# Patient Record
Sex: FEMALE | Race: BLACK OR AFRICAN AMERICAN | NOT HISPANIC OR LATINO | Employment: FULL TIME | URBAN - METROPOLITAN AREA
[De-identification: names, ages, dates, MRNs, and addresses within clinical notes are randomized per-mention and may not be internally consistent; named-entity substitution may affect disease eponyms.]

---

## 2018-01-02 ENCOUNTER — OFFICE VISIT CONVERTED (OUTPATIENT)
Dept: INTERNAL MEDICINE | Facility: CLINIC | Age: 23
End: 2018-01-02
Attending: NURSE PRACTITIONER

## 2018-09-07 ENCOUNTER — OFFICE VISIT CONVERTED (OUTPATIENT)
Dept: INTERNAL MEDICINE | Facility: CLINIC | Age: 23
End: 2018-09-07
Attending: NURSE PRACTITIONER

## 2019-03-08 ENCOUNTER — HOSPITAL ENCOUNTER (OUTPATIENT)
Dept: OTHER | Facility: HOSPITAL | Age: 24
Discharge: HOME | End: 2019-03-08
Attending: NURSE PRACTITIONER

## 2019-03-08 ENCOUNTER — OFFICE VISIT CONVERTED (OUTPATIENT)
Dept: INTERNAL MEDICINE | Facility: CLINIC | Age: 24
End: 2019-03-08
Attending: NURSE PRACTITIONER

## 2019-03-08 LAB
APPEARANCE UR: ABNORMAL
BILIRUB UR QL: NEGATIVE
COLOR UR: YELLOW
CONV BACTERIA: ABNORMAL
CONV COLLECTION SOURCE (UA): ABNORMAL
CONV HYALINE CASTS IN URINE MICRO: ABNORMAL /[LPF]
CONV UROBILINOGEN IN URINE BY AUTOMATED TEST STRIP: 0.2 {EHRLICHU}/DL (ref 0.1–1)
GLUCOSE UR QL: NEGATIVE MG/DL
HGB UR QL STRIP: ABNORMAL
KETONES UR QL STRIP: NEGATIVE MG/DL
LEUKOCYTE ESTERASE UR QL STRIP: ABNORMAL
NITRITE UR QL STRIP: NEGATIVE
PH UR STRIP.AUTO: 5.5 [PH] (ref 5–8)
PROT UR QL: NEGATIVE MG/DL
RBC #/AREA URNS HPF: ABNORMAL /[HPF]
SP GR UR: 1.02 (ref 1–1.03)
SQUAMOUS SPT QL MICRO: ABNORMAL /[HPF]
WBC #/AREA URNS HPF: ABNORMAL /[HPF]

## 2019-03-10 LAB
AMOXICILLIN+CLAV SUSC ISLT: <=2
AMPICILLIN SUSC ISLT: <=2
AMPICILLIN+SULBAC SUSC ISLT: <=2
BACTERIA UR CULT: ABNORMAL
CEFAZOLIN SUSC ISLT: <=4
CEFEPIME SUSC ISLT: <=1
CEFTAZIDIME SUSC ISLT: <=1
CEFTRIAXONE SUSC ISLT: <=1
CEFUROXIME ORAL SUSC ISLT: 4
CEFUROXIME PARENTER SUSC ISLT: 4
CIPROFLOXACIN SUSC ISLT: <=0.25
ERTAPENEM SUSC ISLT: <=0.5
GENTAMICIN SUSC ISLT: <=1
LEVOFLOXACIN SUSC ISLT: <=0.12
NITROFURANTOIN SUSC ISLT: <=16
TETRACYCLINE SUSC ISLT: <=1
TMP SMX SUSC ISLT: <=20
TOBRAMYCIN SUSC ISLT: <=1

## 2019-09-24 ENCOUNTER — OFFICE VISIT CONVERTED (OUTPATIENT)
Dept: INTERNAL MEDICINE | Facility: CLINIC | Age: 24
End: 2019-09-24
Attending: PHYSICIAN ASSISTANT

## 2019-09-24 ENCOUNTER — HOSPITAL ENCOUNTER (OUTPATIENT)
Dept: OTHER | Facility: HOSPITAL | Age: 24
Discharge: HOME | End: 2019-09-24
Attending: PHYSICIAN ASSISTANT

## 2019-09-26 LAB — BACTERIA SPEC AEROBE CULT: NORMAL

## 2019-12-02 ENCOUNTER — HOSPITAL ENCOUNTER (OUTPATIENT)
Dept: URGENT CARE | Facility: CLINIC | Age: 24
Discharge: HOME | End: 2019-12-02
Attending: EMERGENCY MEDICINE

## 2019-12-30 ENCOUNTER — OFFICE VISIT CONVERTED (OUTPATIENT)
Dept: INTERNAL MEDICINE | Facility: CLINIC | Age: 24
End: 2019-12-30
Attending: PHYSICIAN ASSISTANT

## 2019-12-30 ENCOUNTER — HOSPITAL ENCOUNTER (OUTPATIENT)
Dept: OTHER | Facility: HOSPITAL | Age: 24
Discharge: HOME | End: 2019-12-30
Attending: PHYSICIAN ASSISTANT

## 2019-12-30 LAB
APPEARANCE UR: ABNORMAL
BILIRUB UR QL: NEGATIVE
COLOR UR: YELLOW
CONV BACTERIA: ABNORMAL
CONV COLLECTION SOURCE (UA): ABNORMAL
CONV HYALINE CASTS IN URINE MICRO: ABNORMAL /[LPF]
CONV UROBILINOGEN IN URINE BY AUTOMATED TEST STRIP: 1 {EHRLICHU}/DL (ref 0.1–1)
GLUCOSE UR QL: NEGATIVE MG/DL
HGB UR QL STRIP: ABNORMAL
KETONES UR QL STRIP: NEGATIVE MG/DL
LEUKOCYTE ESTERASE UR QL STRIP: ABNORMAL
NITRITE UR QL STRIP: NEGATIVE
PH UR STRIP.AUTO: 6 [PH] (ref 5–8)
PROT UR QL: 30 MG/DL
RBC #/AREA URNS HPF: ABNORMAL /[HPF]
SP GR UR: 1.03 (ref 1–1.03)
SQUAMOUS SPT QL MICRO: ABNORMAL /[HPF]
WBC #/AREA URNS HPF: ABNORMAL /[HPF]

## 2020-01-02 LAB
AMOXICILLIN+CLAV SUSC ISLT: <=2
AMPICILLIN SUSC ISLT: <=2
AMPICILLIN+SULBAC SUSC ISLT: <=2
BACTERIA UR CULT: ABNORMAL
BACTERIA UR CULT: ABNORMAL
CEFAZOLIN SUSC ISLT: <=4
CEFEPIME SUSC ISLT: <=1
CEFTAZIDIME SUSC ISLT: <=1
CEFTRIAXONE SUSC ISLT: <=1
CEFUROXIME ORAL SUSC ISLT: 4
CEFUROXIME PARENTER SUSC ISLT: 4
CIPROFLOXACIN SUSC ISLT: <=0.25
ERTAPENEM SUSC ISLT: <=0.5
GENTAMICIN SUSC ISLT: <=1
LEVOFLOXACIN SUSC ISLT: <=0.12
NITROFURANTOIN SUSC ISLT: <=16
TETRACYCLINE SUSC ISLT: <=1
TMP SMX SUSC ISLT: <=20
TOBRAMYCIN SUSC ISLT: <=1

## 2020-05-05 ENCOUNTER — HOSPITAL ENCOUNTER (OUTPATIENT)
Dept: URGENT CARE | Facility: CLINIC | Age: 25
Discharge: HOME | End: 2020-05-05
Attending: PHYSICIAN ASSISTANT

## 2020-07-12 ENCOUNTER — HOSPITAL ENCOUNTER (OUTPATIENT)
Dept: LABOR AND DELIVERY | Facility: HOSPITAL | Age: 25
Discharge: HOME | End: 2020-07-12
Attending: OBSTETRICS & GYNECOLOGY

## 2020-09-08 ENCOUNTER — OFFICE VISIT CONVERTED (OUTPATIENT)
Dept: INTERNAL MEDICINE | Facility: CLINIC | Age: 25
End: 2020-09-08
Attending: NURSE PRACTITIONER

## 2020-09-08 ENCOUNTER — CONVERSION ENCOUNTER (OUTPATIENT)
Dept: INTERNAL MEDICINE | Facility: CLINIC | Age: 25
End: 2020-09-08

## 2020-09-08 ENCOUNTER — HOSPITAL ENCOUNTER (OUTPATIENT)
Dept: OTHER | Facility: HOSPITAL | Age: 25
Discharge: HOME | End: 2020-09-08
Attending: NURSE PRACTITIONER

## 2020-09-08 LAB
APPEARANCE UR: ABNORMAL
BILIRUB UR QL: NEGATIVE
CASTS URNS QL MICRO: ABNORMAL /[LPF]
COLOR UR: YELLOW
CONV BACTERIA: ABNORMAL
CONV COLLECTION SOURCE (UA): ABNORMAL
CONV UROBILINOGEN IN URINE BY AUTOMATED TEST STRIP: 1 {EHRLICHU}/DL (ref 0.1–1)
GLUCOSE UR QL: NEGATIVE MG/DL
HGB UR QL STRIP: ABNORMAL
KETONES UR QL STRIP: NEGATIVE MG/DL
LEUKOCYTE ESTERASE UR QL STRIP: ABNORMAL
NITRITE UR QL STRIP: NEGATIVE
PH UR STRIP.AUTO: 7.5 [PH] (ref 5–8)
PROT UR QL: 30 MG/DL
RBC #/AREA URNS HPF: ABNORMAL /[HPF]
SP GR UR: 1.02 (ref 1–1.03)
SQUAMOUS SPT QL MICRO: ABNORMAL /[HPF]
WBC #/AREA URNS HPF: ABNORMAL /[HPF]

## 2020-09-11 LAB
BACTERIA UR CULT: ABNORMAL
CIPROFLOXACIN SUSC ISLT: <=0.5
DAPTOMYCIN SUSC ISLT: 0.5
DOXYCYCLINE SUSC ISLT: <=0.5
ERYTHROMYCIN SUSC ISLT: <=0.25
GENTAMICIN SUSC ISLT: <=0.5
LEVOFLOXACIN SUSC ISLT: 0.5
NITROFURANTOIN SUSC ISLT: <=16
OXACILLIN SUSC ISLT: 0.5
RIFAMPIN SUSC ISLT: <=0.5
TETRACYCLINE SUSC ISLT: <=1
TIGECYCLINE SUSC ISLT: <=0.12
TMP SMX SUSC ISLT: <=10
VANCOMYCIN SUSC ISLT: 1

## 2020-12-17 ENCOUNTER — OFFICE VISIT CONVERTED (OUTPATIENT)
Dept: INTERNAL MEDICINE | Facility: CLINIC | Age: 25
End: 2020-12-17
Attending: NURSE PRACTITIONER

## 2021-05-13 NOTE — PROGRESS NOTES
"   Progress Note      Patient Name: Angelica Reynaga   Patient ID: [id number]   Sex: Female   YOB: 1995    Primary Care Provider: Shanda REECE    Visit Date: September 8, 2020    Provider: CHEVY Wasserman   Location: [facility]   Location Address: [address] [place] [address], [facility] [address]  [place], [place]  [id number]   Location Phone: [phone]          Chief Complaint  · \"having burning with urination\"  · F/U with zoloft      History Of Present Illness  Angelica Reynaga is a 25 year old /Black female who presents for evaluation and treatment of:      F/U     Zoloft working well. she has been getting it from Dr. Rock since postpartum.  She was started on 50 mg about 3 weeks ago and reports doing well.  She is not having any SI or HI.    \"having burning with urination\" x5 days  she also reports having vaginal discharge, \"white/yellow chunks\"  using monistat       Past Medical History  Disease Name Date Onset Notes   Anxiety --  --    Hemorrhoids --  --    Ovarian cyst --  --          Past Surgical History  Procedure Name Date Notes   Insertion of Nexplanon implant 2014 --    Removal of Nexplanon implant 2017 --          Medication List  Name Date Started Instructions   Zoloft 50 mg oral tablet  take 1 tablet (50 mg) by oral route once daily for 30 days         Allergy List  Allergen Name Date Reaction Notes   NO KNOWN DRUG ALLERGIES --  --  --          Family Medical History  Disease Name Relative/Age Notes   Stroke  --    Diabetes Mellitus, Type II  --          Social History  Finding Status Start/Stop Quantity Notes   Alcohol --  --/-- --  --    Tobacco Never --/-- --  --          Immunizations  NameDate Admin Mfg Trade Name Lot Number Route Inj VIS Given VIS Publication   Eleejtduk17/11/2018 [employer] Fluzone > 3 Years [id number] IM LD 10/11/2018 08/19/2014   Comments: pt tolerated well and left offie in stable condition, CARO Vargas         Review of " "Systems  · Constitutional  o Denies  o : fever, fatigue, weight loss, weight gain  · Cardiovascular  o Denies  o : lower extremity edema, claudication, chest pressure, palpitations  · Respiratory  o Denies  o : shortness of breath, wheezing, cough, hemoptysis, dyspnea on exertion  · Gastrointestinal  o Denies  o : nausea, vomiting, diarrhea, constipation, abdominal pain      Vitals  Date Time BP Position Site L\R Cuff Size HR RR TEMP (F) WT  HT  BMI kg/m2 BSA m2 O2 Sat HC       09/24/2019 02:09 /64 Sitting    76 - R 14 98.3 150lbs 3oz    99 %    12/30/2019 02:26 /58 Sitting    89 - R 16 98.2 150lbs 2oz    98 %    09/08/2020 01:00 /70 Sitting    98 - R  98.1 149lbs 0oz 4'  11\" 30.09 1.68 98 %          Physical Examination  · Constitutional  o Appearance  o : no acute distress, well-nourished  · Head and Face  o Head  o :   § Inspection  § : atraumatic, normocephalic  · Eyes  o Eyes  o : extraocular movements intact, no scleral icterus, no conjunctival injection  · Ears, Nose, Mouth and Throat  o Ears  o :   § External Ears  § : normal  o Nose  o :   § Intranasal Exam  § : nares patent  o Oral Cavity  o :   § Oral Mucosa  § : moist mucous membranes  · Respiratory  o Respiratory Effort  o : breathing comfortably, symmetric chest rise  o Auscultation of Lungs  o : clear to asculatation bilaterally, no wheezes, rales, or rhonchii  · Cardiovascular  o Heart  o :   § Auscultation of Heart  § : regular rate and rhythm, no murmurs, rubs, or gallops  o Peripheral Vascular System  o :   § Extremities  § : no edema  · Gastrointestinal  o Abdominal Examination  o :   § Abdomen  § : bowel sounds present, non-distended, non-tender, no CVA tenderness  · Lymphatic  o Neck  o : no lymphadenopathy present  · Neurologic  o Mental Status Examination  o :   § Orientation  § : grossly oriented to person, place and time  o Gait and Station  o :   § Gait Screening  § : normal gait  · Psychiatric  o General  o : normal mood " and affect          Results  · In-Office Procedures  o Lab procedure  § IOP - Urinalysis without Microscopy (Clinitek) [facility] ([id number])   § Color Ur: Yellow   § Clarity Ur: Cloudy   § Glucose Ur Ql Strip: Negative   § Bilirub Ur Ql Strip: Negative   § Ketones Ur Ql Strip: Negative   § Sp Gr Ur Qn: 1.020   § Hgb Ur Ql Strip: Trace-Intact   § pH Ur-LsCnc: 7.0   § Prot Ur Ql Strip: 100 mg/dL   § Urobilinogen Ur Strip-mCnc: 0.2 E.U./dL   § Nitrite Ur Ql Strip: Negative   § WBC Est Ur Ql Strip: Large       Assessment  · Anxiety     300.02/F41.1  · Screening for depression     V79.0/Z13.89  · Depression     311/F32.9  Doing well with Zoloft 50 mg. We will follow-up in 3 months to see how she is doing. She also verbalized that she would like to consider doing counseling. Resources provided  · Dysuria     788.1/R30.0  UA abnormal in the office. Sending for micro and culture. Will start on Bactrim DS x5 days.    Problems Reconciled  Plan  · Orders  o ACO-39: Current medications updated and reviewed () - - 09/08/2020  o Urinalysis with Reflex Microscopy if abnormal ([facility]) ([id number]) - V79.0/Z13.89, 788.1/R30.0 - 09/08/2020  o Urine Culture (Clean Catch) [facility] ([id number]) - V79.0/Z13.89, 788.1/R30.0 - 09/08/2020  o Vaginal Screen (Candida, Gardnerella & Trichomonas) ([id number]) - V79.0/Z13.89, 788.1/R30.0 - 09/08/2020  · Medications  o Bactrim -160 mg oral tablet   SIG: take 1 tablet by oral route every 12 hours for 5 days   DISP: (10) tablets with 0 refills  Prescribed on 09/08/2020     o Zoloft 50 mg oral tablet   SIG: take 1 tablet (50 mg) by oral route once daily for 90 days   DISP: (90) tablets with 0 refills  Adjusted on 09/08/2020     o Medications have been Reconciled  o Transition of Care or Provider Policy  · Instructions  o Depression Screen completed and scanned into the EMR under the designated folder within the patient's documents.  o Discussed the need for therapy, either with a certified counselor, psychologist, and/or  family . If no improvement is noted or worsening of their condition, return to office or ER. But also discussed with patient that if they are non-responsive to the type of medication they may need to see a psychiatrist for further evaluation and management.  o Patient was given an SSRI/SSNRI medication and warned of possible side effects of the medication including potential for increased risk of suicidal thoughts and feelings. Patient was instructed that if they begin to exhibit any of these effects they will discontinue the medication immediately and contact our office or the ER ASAP.  o Patient was educated/instructed on their diagnosis, treatment and medications prior to discharge from the clinic today.  o Call the office with any concerns or questions.  · Disposition  o Call or Return if symptoms worsen or persist.  o Prescriptions sent electronically            Electronically Signed by: CHEVY Wasserman -Author on September 8, 2020 02:21:31 PM

## 2021-05-14 VITALS
TEMPERATURE: 98.1 F | OXYGEN SATURATION: 98 % | BODY MASS INDEX: 30.04 KG/M2 | WEIGHT: 149 LBS | SYSTOLIC BLOOD PRESSURE: 122 MMHG | HEIGHT: 59 IN | DIASTOLIC BLOOD PRESSURE: 70 MMHG | HEART RATE: 98 BPM

## 2021-05-14 VITALS
WEIGHT: 156.25 LBS | RESPIRATION RATE: 16 BRPM | HEART RATE: 77 BPM | OXYGEN SATURATION: 99 % | TEMPERATURE: 97.4 F | BODY MASS INDEX: 31.5 KG/M2 | SYSTOLIC BLOOD PRESSURE: 118 MMHG | HEIGHT: 59 IN | DIASTOLIC BLOOD PRESSURE: 72 MMHG

## 2021-05-14 NOTE — PROGRESS NOTES
Progress Note      Patient Name: Angelica Reynaga   Patient ID: [id number]   Sex: Female   YOB: 1995    Primary Care Provider: Shanda REECE    Visit Date: December 17, 2020    Provider: CHEVY Wasserman   Location: [facility]   Location Address: [address] [place] [address], [address]  [place], [place]  [id number]   Location Phone: [phone]          Chief Complaint  · Follow-up      History Of Present Illness  Angelica Reynaga is a 25 year old /Black female who presents for evaluation and treatment of:      anxiety-Zoloft working well    right hand, irritation on 5th digit following injury that was 6 mths ago that she thought healed    congestion in the last 2-3 mths  denies feeling sick, fever, chills, body aches  cough off and on  has tried decongestants, Benadryl without improvement  difficulty hearing at times       Past Medical History  Disease Name Date Onset Notes   Anxiety --  --    Hemorrhoids --  --    Ovarian cyst --  --          Past Surgical History  Procedure Name Date Notes   Insertion of Nexplanon implant 2014 --    Removal of Nexplanon implant 2017 --          Medication List  Name Date Started Instructions   Zoloft 50 mg oral tablet 12/17/2020 take 1 tablet (50 mg) by oral route once daily for 90 days         Allergy List  Allergen Name Date Reaction Notes   NO KNOWN DRUG ALLERGIES --  --  --          Family Medical History  Disease Name Relative/Age Notes   Stroke  --    Diabetes Mellitus, Type II  --          Social History  Finding Status Start/Stop Quantity Notes   Alcohol --  --/-- --  --    Tobacco Never --/-- --  --          Immunizations  NameDate Admin Mfg Trade Name Lot Number Route Inj VIS Given VIS Publication   Fhnapodbi02/17/2020 [facility] Fluzone Quadrivalent [id number] IM LD 08/15/2019 08/15/2019   Comments: pt tolerated well, left office in stable condition         Review of Systems  · Constitutional  o Denies  o : fever, fatigue, weight loss,  "weight gain  · Cardiovascular  o Denies  o : lower extremity edema, claudication, chest pressure, palpitations  · Respiratory  o Admits  o : cough  o Denies  o : shortness of breath, wheezing, hemoptysis, dyspnea on exertion  · Gastrointestinal  o Denies  o : nausea, vomiting, diarrhea, constipation, abdominal pain      Vitals  Date Time BP Position Site L\R Cuff Size HR RR TEMP (F) WT  HT  BMI kg/m2 BSA m2 O2 Sat FR L/min FiO2 HC       12/30/2019 02:26 /58 Sitting    89 - R 16 98.2 150lbs 2oz    98 %      09/08/2020 01:00 /70 Sitting    98 - R  98.1 149lbs 0oz 4'  11\" 30.09 1.68 98 %      12/17/2020 10:31 /72 Sitting    77 - R 16 97.4 156lbs 4oz 4'  11\" 31.56 1.72 99 %  21%          Physical Examination  · Constitutional  o Appearance  o : no acute distress, well-nourished  · Head and Face  o Head  o :   § Inspection  § : atraumatic, normocephalic  · Eyes  o Eyes  o : extraocular movements intact, no scleral icterus, no conjunctival injection  · Ears, Nose, Mouth and Throat  o Ears  o :   § External Ears  § : normal  § Otoscopic Examination  § : Cerumen impaction, unable to visualize TMs even following irrigation, some erythema of right outer canal status post irrigation  o Nose  o :   § Intranasal Exam  § : nares patent  o Oral Cavity  o :   § Oral Mucosa  § : moist mucous membranes  o Throat  o :   § Oropharynx  § : no inflammation or lesions present, tonsils within normal limits  · Respiratory  o Respiratory Effort  o : breathing comfortably, symmetric chest rise  o Auscultation of Lungs  o : clear to asculatation bilaterally, no wheezes, rales, or rhonchii  · Cardiovascular  o Heart  o :   § Auscultation of Heart  § : regular rate and rhythm, no murmurs, rubs, or gallops  o Peripheral Vascular System  o :   § Extremities  § : no edema  · Lymphatic  o Neck  o : no lymphadenopathy present  o Supraclavicular Nodes  o : no supraclavicular nodes  · Skin and Subcutaneous Tissue  o General " Inspection  o : Small wart like lesion on right lateral surface of fifth digit near tip  · Neurologic  o Mental Status Examination  o :   § Orientation  § : grossly oriented to person, place and time  o Gait and Station  o :   § Gait Screening  § : normal gait  · Psychiatric  o General  o : normal mood and affect          Assessment  · Anxiety     300.02/F41.1  Well-controlled, continue Zoloft  · Need for influenza vaccination     V04.81/Z23  · Wart     078.10/B07.9  Cryotherapy utilized and tolerated well. Follow-up care instructions provided  · Cerumen impaction     380.4/H61.20  Ears irrigated bilaterally during visit. She will use Debrox drops at home for the next 2 weeks and call if pain, abnormal discharge or fever develops  · Hearing abnormally acute, bilateral     388.42/H93.233  Likely secondary to cerumen impaction bilaterally as well as sinus congestion. She will call if this does not improve in 2 to 3 weeks  · Congestion of nasal sinus     478.19/R09.81  We will try Zyrtec daily for the next 3 weeks. If symptoms are not improved by that time, she will call for further eval. Discussed risk of sinusitis but low suspicion for this at this time.    Problems Reconciled  Plan  · Orders  o Fluzone Quadrivalent Vaccine, age 6 months + (18520) - V04.81/Z23 - 12/17/2020   Vaccine - Influenza; Dose: 0.5; Site: Left Deltoid; Route: Intramuscular; Date: 12/17/2020 11:46:00; Exp: 06/01/2021; Lot: [id number]; Mfg: sanofi pasteur; TradeName: Fluzone Quadrivalent; Administered By: Deon Trevino MA; Comment: pt tolerated well, left office in stable condition  o ACO-14: Influenza immunization administered or previously received () - V04.81/Z23 - 12/17/2020  o Immunization Admin Fee (Single) ([facility]) (84767) - V04.81/Z23 - 12/17/2020  o ACO-39: Current medications updated and reviewed (1159F, ) - - 12/17/2020  · Medications  o Zyrtec 10 mg oral tablet   SIG: take 1 tablet (10 mg) by oral route once daily for 90 days    DISP: (90) Tablet with 1 refills  Prescribed on 12/17/2020     o Zoloft 50 mg oral tablet   SIG: take 1 tablet (50 mg) by oral route once daily for 90 days   DISP: (90) Tablet with 1 refills  Adjusted on 12/17/2020     o Medications have been Reconciled  o Transition of Care or Provider Policy  · Instructions  o Patient was educated/instructed on their diagnosis, treatment and medications prior to discharge from the clinic today.  o Call the office with any concerns or questions.  o Discussed Covid-19 precautions including, but not limited to, social distancing, avoid touching your face, and hand washing.   · Disposition  o Call or Return if symptoms worsen or persist.  o Follow up in 6 months  o Prescriptions sent electronically            Electronically Signed by: CHEVY Wasserman -Author on December 17, 2020 12:34:18 PM

## 2021-05-15 VITALS
TEMPERATURE: 98.1 F | WEIGHT: 144.5 LBS | BODY MASS INDEX: 29.13 KG/M2 | DIASTOLIC BLOOD PRESSURE: 72 MMHG | SYSTOLIC BLOOD PRESSURE: 118 MMHG | HEIGHT: 59 IN | OXYGEN SATURATION: 98 % | RESPIRATION RATE: 12 BRPM | HEART RATE: 88 BPM

## 2021-05-15 VITALS
DIASTOLIC BLOOD PRESSURE: 58 MMHG | WEIGHT: 150.12 LBS | RESPIRATION RATE: 16 BRPM | TEMPERATURE: 98.2 F | OXYGEN SATURATION: 98 % | SYSTOLIC BLOOD PRESSURE: 102 MMHG | HEART RATE: 89 BPM

## 2021-05-15 VITALS
TEMPERATURE: 98.3 F | DIASTOLIC BLOOD PRESSURE: 64 MMHG | RESPIRATION RATE: 14 BRPM | WEIGHT: 150.19 LBS | HEART RATE: 76 BPM | SYSTOLIC BLOOD PRESSURE: 128 MMHG | OXYGEN SATURATION: 99 %

## 2021-05-16 VITALS
TEMPERATURE: 96.9 F | HEART RATE: 84 BPM | BODY MASS INDEX: 26.51 KG/M2 | HEIGHT: 59 IN | DIASTOLIC BLOOD PRESSURE: 60 MMHG | OXYGEN SATURATION: 99 % | RESPIRATION RATE: 15 BRPM | WEIGHT: 131.5 LBS | SYSTOLIC BLOOD PRESSURE: 110 MMHG

## 2021-05-16 VITALS
SYSTOLIC BLOOD PRESSURE: 130 MMHG | BODY MASS INDEX: 35.58 KG/M2 | TEMPERATURE: 97.6 F | HEART RATE: 101 BPM | DIASTOLIC BLOOD PRESSURE: 78 MMHG | RESPIRATION RATE: 15 BRPM | OXYGEN SATURATION: 99 % | HEIGHT: 59 IN | WEIGHT: 176.5 LBS

## 2021-07-14 PROCEDURE — 87491 CHLMYD TRACH DNA AMP PROBE: CPT | Performed by: FAMILY MEDICINE

## 2021-07-14 PROCEDURE — 87591 N.GONORRHOEAE DNA AMP PROB: CPT | Performed by: FAMILY MEDICINE

## 2021-07-14 PROCEDURE — G0432 EIA HIV-1/HIV-2 SCREEN: HCPCS | Performed by: FAMILY MEDICINE

## 2021-07-14 PROCEDURE — 86592 SYPHILIS TEST NON-TREP QUAL: CPT | Performed by: FAMILY MEDICINE

## 2021-07-14 PROCEDURE — 87086 URINE CULTURE/COLONY COUNT: CPT | Performed by: FAMILY MEDICINE

## 2021-07-14 PROCEDURE — 80074 ACUTE HEPATITIS PANEL: CPT | Performed by: FAMILY MEDICINE

## 2021-12-06 ENCOUNTER — OFFICE VISIT (OUTPATIENT)
Dept: INTERNAL MEDICINE | Facility: CLINIC | Age: 26
End: 2021-12-06

## 2021-12-06 VITALS
TEMPERATURE: 97.4 F | WEIGHT: 162 LBS | DIASTOLIC BLOOD PRESSURE: 70 MMHG | HEIGHT: 59 IN | RESPIRATION RATE: 16 BRPM | SYSTOLIC BLOOD PRESSURE: 108 MMHG | HEART RATE: 87 BPM | OXYGEN SATURATION: 99 % | BODY MASS INDEX: 32.66 KG/M2

## 2021-12-06 DIAGNOSIS — J02.9 SORE THROAT: ICD-10-CM

## 2021-12-06 DIAGNOSIS — J01.90 ACUTE SINUSITIS, RECURRENCE NOT SPECIFIED, UNSPECIFIED LOCATION: Primary | ICD-10-CM

## 2021-12-06 PROCEDURE — 99213 OFFICE O/P EST LOW 20 MIN: CPT | Performed by: PHYSICIAN ASSISTANT

## 2021-12-06 RX ORDER — AMOXICILLIN AND CLAVULANATE POTASSIUM 875; 125 MG/1; MG/1
1 TABLET, FILM COATED ORAL 2 TIMES DAILY
Qty: 20 TABLET | Refills: 0 | Status: SHIPPED | OUTPATIENT
Start: 2021-12-06 | End: 2021-12-16

## 2021-12-06 RX ORDER — FLUTICASONE PROPIONATE 50 MCG
2 SPRAY, SUSPENSION (ML) NASAL DAILY
Qty: 11.1 ML | Refills: 1 | Status: SHIPPED | OUTPATIENT
Start: 2021-12-06 | End: 2022-03-31 | Stop reason: SDUPTHER

## 2021-12-06 RX ORDER — CETIRIZINE HYDROCHLORIDE 10 MG/1
10 TABLET ORAL DAILY
Qty: 30 TABLET | Refills: 1 | Status: SHIPPED | OUTPATIENT
Start: 2021-12-06 | End: 2022-03-31 | Stop reason: SDUPTHER

## 2021-12-06 NOTE — PROGRESS NOTES
"Chief Complaint  Nasal Congestion    Subjective          Angelica Reynaga presents to [facility]  Sore throat, nasal congestion, runny nose on and off x 1 month   Ear pain occasionally. [place], R side of head is worse than L. Denies unilateral weakness, slurred speech, confusion.  Denies fever, chills, body aches  Denies cough, wheezing, sob, resp distress  Appetite and energy are normal   Kids had viral infection at home.  Pt has tried benadryl, , Nyquil which helped with congestion but never resolved sx      Past Medical History:   Diagnosis Date   • Anxiety    • Depression         Past Surgical History:   Procedure Laterality Date   •  SECTION      x2        Current Outpatient Medications on File Prior to Visit   Medication Sig Dispense Refill   • Etonogestrel (NEXPLANON) 68 MG implant subdermal implant Inject 1 each into the appropriate area of the skin as directed by provider 1 (One) Time.     • sertraline (ZOLOFT) 50 MG tablet Take 50 mg by mouth Daily.       No current facility-administered medications on file prior to visit.        No Known Allergies    Social History     Tobacco Use   Smoking Status Never Smoker   Smokeless Tobacco Never Used          Objective   Vital Signs:   /70   Pulse 87   Temp 97.4 °F (36.3 °C)   Resp 16   Ht 149.9 cm (59\")   Wt 73.5 kg (162 lb)   SpO2 99%   BMI 32.72 kg/m²     Physical Exam  Vitals reviewed.   Constitutional:       Appearance: Normal appearance.   HENT:      Head: Normocephalic and atraumatic.      Nose: Congestion and rhinorrhea present.      Mouth/Throat:      Mouth: Mucous membranes are moist.      Pharynx: Uvula midline. Posterior oropharyngeal erythema present. No oropharyngeal exudate.      Tonsils: 2+ on the right. 2+ on the left.   Eyes:      Extraocular Movements: Extraocular movements intact.      Conjunctiva/sclera: Conjunctivae normal.      Pupils: Pupils are equal, round, and reactive " to light.   Cardiovascular:      Rate and Rhythm: Normal rate and regular rhythm.   Pulmonary:      Effort: Pulmonary effort is normal.      Breath sounds: Normal breath sounds.   Abdominal:      General: Abdomen is flat. Bowel sounds are normal.      Palpations: Abdomen is soft.   Musculoskeletal:         General: Normal range of motion.   Neurological:      General: No focal deficit present.      Mental Status: She is alert and oriented to person, place, and time.   Psychiatric:         Mood and Affect: Mood normal.        Result Review :                 Assessment and Plan    Diagnoses and all orders for this visit:    1. Acute sinusitis, recurrence not specified, unspecified location (Primary)  Assessment & Plan:  Discussed sinus infection. Increase water intake, rest, hand hygiene. Tylenol/motrin PRN fever or pain. Start OTC antihistamines and nasal steroid. Will start antibiotic today. Patient will let us know if the symptoms are not resolved with conservative treatment.        2. Sore throat    Other orders  -     amoxicillin-clavulanate (Augmentin) 875-125 MG per tablet; Take 1 tablet by mouth 2 (Two) Times a Day for 10 days.  Dispense: 20 tablet; Refill: 0  -     cetirizine (zyrTEC) 10 MG tablet; Take 1 tablet by mouth Daily.  Dispense: 30 tablet; Refill: 1  -     fluticasone (Flonase) 50 MCG/ACT nasal spray; 2 sprays into the nostril(s) as directed by provider Daily.  Dispense: 11.1 mL; Refill: 1      Follow Up   Return if symptoms worsen or fail to improve.  Patient was given instructions and counseling regarding her condition or for health maintenance advice. Please see specific information pulled into the AVS if appropriate.

## 2021-12-06 NOTE — PATIENT INSTRUCTIONS
Sinusitis, Adult  Sinusitis is inflammation of your sinuses. Sinuses are hollow spaces in the bones around your face. Your sinuses are located:  · Around your eyes.  · In the middle of your forehead.  · Behind your nose.  · In your cheekbones.  Mucus normally drains out of your sinuses. When your nasal tissues become inflamed or swollen, mucus can become trapped or blocked. This allows bacteria, viruses, and fungi to grow, which leads to infection. Most infections of the sinuses are caused by a virus.  Sinusitis can develop quickly. It can last for up to 4 weeks (acute) or for more than 12 weeks (chronic). Sinusitis often develops after a cold.  What are the causes?  This condition is caused by anything that creates swelling in the sinuses or stops mucus from draining. This includes:  · Allergies.  · Asthma.  · Infection from bacteria or viruses.  · Deformities or blockages in your nose or sinuses.  · Abnormal growths in the nose (nasal polyps).  · Pollutants, such as chemicals or irritants in the air.  · Infection from fungi (rare).  What increases the risk?  You are more likely to develop this condition if you:  · Have a weak body defense system (immune system).  · Do a lot of swimming or diving.  · Overuse nasal sprays.  · Smoke.  What are the signs or symptoms?  The main symptoms of this condition are pain and a feeling of pressure around the affected sinuses. Other symptoms include:  · Stuffy nose or congestion.  · Thick drainage from your nose.  · Swelling and warmth over the affected sinuses.  · Headache.  · Upper toothache.  · A cough that may get worse at night.  · Extra mucus that collects in the throat or the back of the nose (postnasal drip).  · Decreased sense of smell and taste.  · Fatigue.  · A fever.  · Sore throat.  · Bad breath.  How is this diagnosed?  This condition is diagnosed based on:  · Your symptoms.  · Your medical history.  · A physical exam.  · Tests to find out if your condition is  acute or chronic. This may include:  ? Checking your nose for nasal polyps.  ? Viewing your sinuses using a device that has a light (endoscope).  ? Testing for allergies or bacteria.  ? Imaging tests, such as an MRI or CT scan.  In rare cases, a bone biopsy may be done to rule out more serious types of fungal sinus disease.  How is this treated?  Treatment for sinusitis depends on the cause and whether your condition is chronic or acute.  · If caused by a virus, your symptoms should go away on their own within 10 days. You may be given medicines to relieve symptoms. They include:  ? Medicines that shrink swollen nasal passages (topical intranasal decongestants).  ? Medicines that treat allergies (antihistamines).  ? A spray that eases inflammation of the nostrils (topical intranasal corticosteroids).  ? Rinses that help get rid of thick mucus in your nose (nasal saline washes).  · If caused by bacteria, your health care provider may recommend waiting to see if your symptoms improve. Most bacterial infections will get better without antibiotic medicine. You may be given antibiotics if you have:  ? A severe infection.  ? A weak immune system.  · If caused by narrow nasal passages or nasal polyps, you may need to have surgery.  Follow these instructions at home:  Medicines  · Take, use, or apply over-the-counter and prescription medicines only as told by your health care provider. These may include nasal sprays.  · If you were prescribed an antibiotic medicine, take it as told by your health care provider. Do not stop taking the antibiotic even if you start to feel better.  Hydrate and humidify    · Drink enough fluid to keep your urine pale yellow. Staying hydrated will help to thin your mucus.  · Use a cool mist humidifier to keep the humidity level in your home above 50%.  · Inhale steam for 10-15 minutes, 3-4 times a day, or as told by your health care provider. You can do this in the bathroom while a hot shower is  running.  · Limit your exposure to cool or dry air.    Rest  · Rest as much as possible.  · Sleep with your head raised (elevated).  · Make sure you get enough sleep each night.  General instructions    · Apply a warm, moist washcloth to your face 3-4 times a day or as told by your health care provider. This will help with discomfort.  · Wash your hands often with soap and water to reduce your exposure to germs. If soap and water are not available, use hand .  · Do not smoke. Avoid being around people who are smoking (secondhand smoke).  · Keep all follow-up visits as told by your health care provider. This is important.    Contact a health care provider if:  · You have a fever.  · Your symptoms get worse.  · Your symptoms do not improve within 10 days.  Get help right away if:  · You have a severe headache.  · You have persistent vomiting.  · You have severe pain or swelling around your face or eyes.  · You have vision problems.  · You develop confusion.  · Your neck is stiff.  · You have trouble breathing.  Summary  · Sinusitis is soreness and inflammation of your sinuses. Sinuses are hollow spaces in the bones around your face.  · This condition is caused by nasal tissues that become inflamed or swollen. The swelling traps or blocks the flow of mucus. This allows bacteria, viruses, and fungi to grow, which leads to infection.  · If you were prescribed an antibiotic medicine, take it as told by your health care provider. Do not stop taking the antibiotic even if you start to feel better.  · Keep all follow-up visits as told by your health care provider. This is important.  This information is not intended to replace advice given to you by your health care provider. Make sure you discuss any questions you have with your health care provider.  Document Revised: 05/20/2019 Document Reviewed: 05/20/2019  [employer] Patient Education © 2021 [employer]

## 2021-12-06 NOTE — ASSESSMENT & PLAN NOTE
Discussed sinus infection. Increase water intake, rest, hand hygiene. Tylenol/motrin PRN fever or pain. Start OTC antihistamines and nasal steroid. Will start antibiotic today. Patient will let us know if the symptoms are not resolved with conservative treatment.

## 2021-12-22 ENCOUNTER — OFFICE VISIT (OUTPATIENT)
Dept: OBSTETRICS AND GYNECOLOGY | Facility: CLINIC | Age: 26
End: 2021-12-22

## 2021-12-22 VITALS
SYSTOLIC BLOOD PRESSURE: 119 MMHG | BODY MASS INDEX: 32.05 KG/M2 | HEIGHT: 59 IN | DIASTOLIC BLOOD PRESSURE: 76 MMHG | HEART RATE: 73 BPM | WEIGHT: 159 LBS

## 2021-12-22 DIAGNOSIS — N76.0 ACUTE VAGINITIS: ICD-10-CM

## 2021-12-22 DIAGNOSIS — N93.9 ABNORMAL UTERINE BLEEDING: ICD-10-CM

## 2021-12-22 DIAGNOSIS — F32.A DEPRESSION, UNSPECIFIED DEPRESSION TYPE: ICD-10-CM

## 2021-12-22 DIAGNOSIS — R10.2 PELVIC PAIN IN FEMALE: Primary | ICD-10-CM

## 2021-12-22 PROBLEM — K64.9 HEMORRHOIDS: Status: ACTIVE | Noted: 2021-12-22

## 2021-12-22 PROBLEM — O09.899 HISTORY OF PRETERM DELIVERY, CURRENTLY PREGNANT: Status: ACTIVE | Noted: 2020-01-01

## 2021-12-22 PROBLEM — Z82.79 FAMILY HISTORY OF CLEFT LIP: Status: ACTIVE | Noted: 2018-07-07

## 2021-12-22 PROBLEM — F41.9 ANXIETY: Status: ACTIVE | Noted: 2021-12-22

## 2021-12-22 PROBLEM — N83.209 OVARIAN CYST: Status: ACTIVE | Noted: 2021-12-22

## 2021-12-22 LAB
CANDIDA SPECIES: NEGATIVE
GARDNERELLA VAGINALIS: POSITIVE
T VAGINALIS DNA VAG QL PROBE+SIG AMP: NEGATIVE

## 2021-12-22 PROCEDURE — 87480 CANDIDA DNA DIR PROBE: CPT | Performed by: OBSTETRICS & GYNECOLOGY

## 2021-12-22 PROCEDURE — 87660 TRICHOMONAS VAGIN DIR PROBE: CPT | Performed by: OBSTETRICS & GYNECOLOGY

## 2021-12-22 PROCEDURE — 87510 GARDNER VAG DNA DIR PROBE: CPT | Performed by: OBSTETRICS & GYNECOLOGY

## 2021-12-22 PROCEDURE — 99214 OFFICE O/P EST MOD 30 MIN: CPT | Performed by: OBSTETRICS & GYNECOLOGY

## 2021-12-22 RX ORDER — SERTRALINE HYDROCHLORIDE 25 MG/1
25 TABLET, FILM COATED ORAL DAILY
Qty: 90 TABLET | Refills: 3 | Status: SHIPPED | OUTPATIENT
Start: 2021-12-22 | End: 2022-09-17

## 2021-12-22 RX ORDER — METRONIDAZOLE 500 MG/1
500 TABLET ORAL 2 TIMES DAILY
Qty: 14 TABLET | Refills: 0 | Status: SHIPPED | OUTPATIENT
Start: 2021-12-22 | End: 2021-12-29

## 2021-12-22 NOTE — PROGRESS NOTES
"GYN Problem/Follow Up Visit    Chief Complaint   Patient presents with   • Follow-up     odor and possible cyst on ovary/pain in  area           HPI  Angelica Reynaga is a 26 y.o. female, , who presents for pelvic pain, aub, vaginal odor and itching, and depression. States has been having pelvic pain since last month. Comes and goes. Feels sharp. Hx of ovarian cysts. Also states had nexplanon inserted in April and did not have a menses x 3 months, then was having one and would skip a month, now has had two menses this month. Not heavy or painful. Also c/o vaginal odor and itching for awhile. No new sex partners. Hx bv. Also c/o depression. Was given zoloft and tried to take it but it made her anxiety worse. It was 50 mg and pt requests to try 25 mg. States has had suicidal thoughts in the past, none at present time, no plan. No previous suicide attempts. Good support system.        Additional OB/GYN History   Patient's last menstrual period was 2021 (exact date).  Current contraception: contraceptive methods: Nexplanon  Desires to: continue contraception  Allergies : Patient has no known allergies.     The additional following portions of the patient's history were reviewed and updated as appropriate: allergies, current medications, past family history, past medical history, past social history, past surgical history and problem list.    Review of Systems    I have reviewed and agree with the HPI, ROS, and historical information as entered above. Tory Rosas, APRN    Objective   /76   Pulse 73   Ht 149.9 cm (59\")   Wt 72.1 kg (159 lb)   LMP 2021 (Exact Date)   Breastfeeding No   BMI 32.11 kg/m²     Physical Exam  Vitals reviewed.   Genitourinary:     General: Normal vulva.      Vagina: Vaginal discharge (thin white) present. No erythema, tenderness, bleeding or lesions.      Cervix: Normal.      Uterus: Tender (mild).       Adnexa: Left adnexa normal.        Right: Tenderness " (mild) present.    Skin:     General: Skin is warm and dry.   Neurological:      Mental Status: She is alert and oriented to person, place, and time.            Assessment and Plan    Diagnoses and all orders for this visit:    1. Pelvic pain in female (Primary)  -     US Pelvis Transvaginal Non OB; Future  -     Gardnerella vaginalis, Trichomonas vaginalis, Candida albicans, DNA - Swab, Vagina    2. Abnormal uterine bleeding  -     US Pelvis Transvaginal Non OB; Future  -     Gardnerella vaginalis, Trichomonas vaginalis, Candida albicans, DNA - Swab, Vagina    3. Acute vaginitis  -     Gardnerella vaginalis, Trichomonas vaginalis, Candida albicans, DNA - Swab, Vagina    4. Depression, unspecified depression type  -     sertraline (Zoloft) 25 MG tablet; Take 1 tablet by mouth Daily.  Dispense: 90 tablet; Refill: 3    will check for bv and ovarian cysts. Will switch from zoloft 50 mg to 25 mg once daily. Take it every day as directed. May take several weeks to see a change.     Counseling:  She understands the importance of having the above orders performed in a timely fashion.  She is encouraged to review her results online and/or contact or office if she has questions.     Follow Up:  Return for lab/med/u/s f/u.      Tory Rosas, CHEVY  12/22/2021

## 2021-12-27 ENCOUNTER — TELEPHONE (OUTPATIENT)
Dept: OBSTETRICS AND GYNECOLOGY | Facility: CLINIC | Age: 26
End: 2021-12-27

## 2021-12-27 NOTE — TELEPHONE ENCOUNTER
CALLED AND SPOKE TO FRANCIS REGARDING HER APPT ON 1/24 BEING RESCHEDULED TO 1/25 AT 11:10.  SHE CONFIRMED THE CHANGE

## 2021-12-31 PROCEDURE — U0004 COV-19 TEST NON-CDC HGH THRU: HCPCS | Performed by: FAMILY MEDICINE

## 2022-01-01 ENCOUNTER — TELEPHONE (OUTPATIENT)
Dept: URGENT CARE | Facility: CLINIC | Age: 27
End: 2022-01-01

## 2022-01-01 DIAGNOSIS — U07.1 COVID-19: Primary | ICD-10-CM

## 2022-01-01 NOTE — TELEPHONE ENCOUNTER
With the patient via telephone.  Verify the patient's name, date of birth, and street address.  Patient's Covid test results were positive.  ER precautions, symptomatic management, and quarantine all discussed with the patient.  All questions answered and patient verbalized understanding of information.

## 2022-01-10 PROCEDURE — U0004 COV-19 TEST NON-CDC HGH THRU: HCPCS | Performed by: NURSE PRACTITIONER

## 2022-01-19 ENCOUNTER — HOSPITAL ENCOUNTER (OUTPATIENT)
Dept: ULTRASOUND IMAGING | Facility: HOSPITAL | Age: 27
Discharge: HOME | End: 2022-01-19
Admitting: OBSTETRICS & GYNECOLOGY

## 2022-01-19 DIAGNOSIS — R10.2 PELVIC PAIN IN FEMALE: ICD-10-CM

## 2022-01-19 DIAGNOSIS — N93.9 ABNORMAL UTERINE BLEEDING: ICD-10-CM

## 2022-01-19 PROCEDURE — 76856 US EXAM PELVIC COMPLETE: CPT

## 2022-01-19 PROCEDURE — 76830 TRANSVAGINAL US NON-OB: CPT

## 2022-01-25 ENCOUNTER — TELEPHONE (OUTPATIENT)
Dept: OBSTETRICS AND GYNECOLOGY | Facility: CLINIC | Age: 27
End: 2022-01-25

## 2022-01-25 ENCOUNTER — OFFICE VISIT (OUTPATIENT)
Dept: OBSTETRICS AND GYNECOLOGY | Facility: CLINIC | Age: 27
End: 2022-01-25

## 2022-01-25 VITALS
WEIGHT: 154 LBS | HEIGHT: 59 IN | SYSTOLIC BLOOD PRESSURE: 106 MMHG | BODY MASS INDEX: 31.04 KG/M2 | HEART RATE: 77 BPM | DIASTOLIC BLOOD PRESSURE: 75 MMHG

## 2022-01-25 DIAGNOSIS — R10.2 PELVIC PAIN IN FEMALE: Primary | ICD-10-CM

## 2022-01-25 DIAGNOSIS — N93.9 ABNORMAL UTERINE BLEEDING: ICD-10-CM

## 2022-01-25 PROCEDURE — 99212 OFFICE O/P EST SF 10 MIN: CPT | Performed by: OBSTETRICS & GYNECOLOGY

## 2022-01-25 NOTE — PROGRESS NOTES
"GYN Problem/Follow Up Visit    Chief Complaint   Patient presents with   • US FOLLOW UP           HPI  Angelica Reynaga is a 26 y.o. female, , who presents for lab/med/u/s f/u. Seen 21 with c/o pelvic pain and aub. States sx are much better. Took meds for bv. Was also having depression and anxiety and wanted to cut her zoloft dose in half and see if that helped. States feeling much better on 25 mg. No c/o today.          Additional OB/GYN History   Patient's last menstrual period was 2022.  Current contraception: contraceptive methods: Nexplanon  Desires to: continue contraception  Allergies : Patient has no known allergies.     The additional following portions of the patient's history were reviewed and updated as appropriate: allergies, current medications, past family history, past medical history, past social history, past surgical history and problem list.    Review of Systems    I have reviewed and agree with the HPI, ROS, and historical information as entered above. Tory Rosas, CHEVY    Objective   /75   Pulse 77   Ht 149.9 cm (59\")   Wt 69.9 kg (154 lb)   LMP 2022   BMI 31.10 kg/m²     Physical Exam  Vitals reviewed.   Neurological:      Mental Status: She is alert and oriented to person, place, and time.            Assessment and Plan    Diagnoses and all orders for this visit:    1. Pelvic pain in female (Primary)    2. Abnormal uterine bleeding    reviewed pelvic u/s done 22: normal. Recommend monitoring sx and rto prn. Discussed that her sx could have been r/t underlying infection or nexplanon. Also discussed continuing zoloft. Pt desires.    Counseling:  She understands the importance of having the above orders performed in a timely fashion.  She is encouraged to review her results online and/or contact or office if she has questions.     Follow Up:  Return if symptoms worsen or fail to improve.      CHEVY Quan  2022  "

## 2022-02-09 ENCOUNTER — OFFICE VISIT (OUTPATIENT)
Dept: OBSTETRICS AND GYNECOLOGY | Facility: CLINIC | Age: 27
End: 2022-02-09

## 2022-02-09 VITALS
WEIGHT: 156.4 LBS | HEART RATE: 73 BPM | DIASTOLIC BLOOD PRESSURE: 50 MMHG | HEIGHT: 59 IN | SYSTOLIC BLOOD PRESSURE: 114 MMHG | BODY MASS INDEX: 31.53 KG/M2

## 2022-02-09 DIAGNOSIS — Z20.2 EXPOSURE TO STD: Primary | ICD-10-CM

## 2022-02-09 DIAGNOSIS — N89.8 VAGINAL DISCHARGE: ICD-10-CM

## 2022-02-09 LAB
C TRACH RRNA CVX QL NAA+PROBE: NOT DETECTED
N GONORRHOEA RRNA SPEC QL NAA+PROBE: NOT DETECTED

## 2022-02-09 PROCEDURE — 87491 CHLMYD TRACH DNA AMP PROBE: CPT | Performed by: OBSTETRICS & GYNECOLOGY

## 2022-02-09 PROCEDURE — 87591 N.GONORRHOEAE DNA AMP PROB: CPT | Performed by: OBSTETRICS & GYNECOLOGY

## 2022-02-09 PROCEDURE — 99213 OFFICE O/P EST LOW 20 MIN: CPT | Performed by: OBSTETRICS & GYNECOLOGY

## 2022-02-09 RX ORDER — AZITHROMYCIN 500 MG/1
TABLET, FILM COATED ORAL
Qty: 2 TABLET | Refills: 0 | Status: SHIPPED | OUTPATIENT
Start: 2022-02-09 | End: 2022-09-17

## 2022-02-09 NOTE — PROGRESS NOTES
"GYN Problem/Follow Up Visit    Chief Complaint   Patient presents with   • Gynecologic Exam     std screen           HPI  Angelica Reynaga is a 26 y.o. female, , who presents for std testing. States her partner tested positive for chlamydia yesterday. Pt denies any sx at present.       Additional OB/GYN History   Patient's last menstrual period was 2022 (approximate).  Current contraception: contraceptive methods: Nexplanon  Desires to: continue contraception  Allergies : Patient has no known allergies.     The additional following portions of the patient's history were reviewed and updated as appropriate: allergies, current medications, past family history, past medical history, past social history, past surgical history and problem list.    Review of Systems    I have reviewed and agree with the HPI, ROS, and historical information as entered above. CHEVY Quan    Objective   /50 (BP Location: Right arm, Patient Position: Sitting, Cuff Size: Adult)   Pulse 73   Ht 149.9 cm (59.02\")   Wt 70.9 kg (156 lb 6.4 oz)   LMP 2022 (Approximate)   Breastfeeding No   BMI 31.57 kg/m²     Physical Exam  Vitals reviewed.   Genitourinary:     General: Normal vulva.      Vagina: Vaginal discharge (small amt white d/c) present. No erythema, tenderness, bleeding or lesions.      Cervix: Normal.   Skin:     General: Skin is warm and dry.   Neurological:      Mental Status: She is alert and oriented to person, place, and time.            Assessment and Plan    Diagnoses and all orders for this visit:    1. Exposure to STD (Primary)  -     Chlamydia trachomatis, Neisseria gonorrhoeae, PCR - Swab, Cervix  -     azithromycin (Zithromax) 500 MG tablet; Take two tablets by mouth x 1 dose.  Dispense: 2 tablet; Refill: 0    2. Vaginal discharge    will treat as chlamydia. Recommend abstinence or condom use until negative test.    Counseling:  She understands the importance of having the above orders " performed in a timely fashion.  She is encouraged to review her results online and/or contact or office if she has questions.     Follow Up:  Return if symptoms worsen or fail to improve.      CHEVY Quan  02/09/2022

## 2022-03-31 ENCOUNTER — OFFICE VISIT (OUTPATIENT)
Dept: INTERNAL MEDICINE | Facility: CLINIC | Age: 27
End: 2022-03-31

## 2022-03-31 VITALS
BODY MASS INDEX: 31.25 KG/M2 | DIASTOLIC BLOOD PRESSURE: 68 MMHG | RESPIRATION RATE: 18 BRPM | SYSTOLIC BLOOD PRESSURE: 116 MMHG | HEART RATE: 88 BPM | OXYGEN SATURATION: 98 % | TEMPERATURE: 96.1 F | WEIGHT: 155 LBS | HEIGHT: 59 IN

## 2022-03-31 DIAGNOSIS — H61.20 CERUMEN IN AUDITORY CANAL ON EXAMINATION: ICD-10-CM

## 2022-03-31 DIAGNOSIS — J30.1 ALLERGIC RHINITIS DUE TO POLLEN, UNSPECIFIED SEASONALITY: Primary | ICD-10-CM

## 2022-03-31 PROCEDURE — 99213 OFFICE O/P EST LOW 20 MIN: CPT | Performed by: NURSE PRACTITIONER

## 2022-03-31 RX ORDER — FLUTICASONE PROPIONATE 50 MCG
2 SPRAY, SUSPENSION (ML) NASAL DAILY
Qty: 11.1 ML | Refills: 1 | Status: SHIPPED | OUTPATIENT
Start: 2022-03-31 | End: 2022-09-17

## 2022-03-31 RX ORDER — CETIRIZINE HYDROCHLORIDE 10 MG/1
10 TABLET ORAL DAILY
Qty: 90 TABLET | Refills: 1 | Status: SHIPPED | OUTPATIENT
Start: 2022-03-31 | End: 2022-12-01

## 2022-03-31 NOTE — PROGRESS NOTES
"Chief Complaint  Earache (Bilateral ear pain- 3 Months) and Nasal Congestion    Subjective          Angelica Reynaga presents to [facility]  History of Present Illness  She reports bilateral ear pain x3 mths. She was treated for sinus infection with onset of symptoms. She reports return of pain. She reports ongoing sinus congestion/drainage for several wks.  No longer taking flonase and zyrtec  Denies fever, chills, body aches    Reports irrigated ears out at home prior to dec. She reports stopping this in dec after having ear pain.  Objective   Vital Signs:   /68 (BP Location: Left arm, Patient Position: Sitting, Cuff Size: Adult)   Pulse 88   Temp 96.1 °F (35.6 °C)   Resp 18   Ht 149.9 cm (59.02\")   Wt 70.3 kg (155 lb)   SpO2 98%   BMI 31.28 kg/m²     Physical Exam  Vitals and nursing note reviewed.   Constitutional:       General: She is not in acute distress.     Appearance: Normal appearance.   HENT:      Head: Normocephalic and atraumatic.      Right Ear: Tympanic membrane, ear canal and external ear normal.      Left Ear: Tympanic membrane, ear canal and external ear normal.      Ears:      Comments: TMs partially visible without erythema, dullness  Canals with moderate dry cerumen, not impacted bilaterally     Nose: Nose normal.      Mouth/Throat:      Mouth: Mucous membranes are moist.   Eyes:      Conjunctiva/sclera: Conjunctivae normal.   Cardiovascular:      Rate and Rhythm: Normal rate and regular rhythm.      Pulses: Normal pulses.      Heart sounds: Normal heart sounds. No murmur heard.    No friction rub. No gallop.   Pulmonary:      Effort: Pulmonary effort is normal. No respiratory distress.      Breath sounds: No wheezing, rhonchi or rales.   Musculoskeletal:      Cervical back: Neck supple.      Right lower leg: No edema.      Left lower leg: No edema.   Lymphadenopathy:      Cervical: No cervical adenopathy.   Skin:     General: Skin is " warm and dry.   Neurological:      General: No focal deficit present.      Mental Status: She is alert and oriented to person, place, and time.   Psychiatric:         Mood and Affect: Mood normal.         Behavior: Behavior normal.        Result Review :          Procedures      Assessment and Plan    Diagnoses and all orders for this visit:    1. Allergic rhinitis due to pollen, unspecified seasonality (Primary)  Comments:  restarting Flonase and Zyrtec.  Patient will call if symptoms not improved in 2 weeks    2. Cerumen in auditory canal on examination  Comments:  Discussed use of Debrox drops routinely.  Patient will call if symptoms worsen.  Discussed exam findings with the patient    Other orders  -     fluticasone (Flonase) 50 MCG/ACT nasal spray; 2 sprays into the nostril(s) as directed by provider Daily.  Dispense: 11.1 mL; Refill: 1  -     cetirizine (zyrTEC) 10 MG tablet; Take 1 tablet by mouth Daily.  Dispense: 90 tablet; Refill: 1  -     carbamide peroxide (Debrox) 6.5 % otic solution; Administer 5 drops into both ears As Needed for Ear Pain.  Dispense: 15 mL; Refill: 2              Follow Up   Return if symptoms worsen or fail to improve.  Patient was given instructions and counseling regarding her condition or for health maintenance advice. Please see specific information pulled into the AVS if appropriate.

## 2022-04-25 ENCOUNTER — OFFICE VISIT (OUTPATIENT)
Dept: INTERNAL MEDICINE | Facility: CLINIC | Age: 27
End: 2022-04-25

## 2022-04-25 VITALS
RESPIRATION RATE: 20 BRPM | HEIGHT: 55 IN | WEIGHT: 156.6 LBS | DIASTOLIC BLOOD PRESSURE: 68 MMHG | OXYGEN SATURATION: 98 % | BODY MASS INDEX: 36.24 KG/M2 | HEART RATE: 87 BPM | SYSTOLIC BLOOD PRESSURE: 112 MMHG | TEMPERATURE: 98.4 F

## 2022-04-25 DIAGNOSIS — J10.1 INFLUENZA A: Primary | ICD-10-CM

## 2022-04-25 DIAGNOSIS — H66.001 NON-RECURRENT ACUTE SUPPURATIVE OTITIS MEDIA OF RIGHT EAR WITHOUT SPONTANEOUS RUPTURE OF TYMPANIC MEMBRANE: ICD-10-CM

## 2022-04-25 DIAGNOSIS — H92.03 OTALGIA, BILATERAL: ICD-10-CM

## 2022-04-25 DIAGNOSIS — H61.23 BILATERAL IMPACTED CERUMEN: ICD-10-CM

## 2022-04-25 DIAGNOSIS — R68.83 CHILLS: ICD-10-CM

## 2022-04-25 LAB
EXPIRATION DATE: ABNORMAL
FLUAV AG UPPER RESP QL IA.RAPID: DETECTED
FLUBV AG UPPER RESP QL IA.RAPID: NOT DETECTED
INTERNAL CONTROL: ABNORMAL
Lab: ABNORMAL
SARS-COV-2 AG UPPER RESP QL IA.RAPID: NOT DETECTED

## 2022-04-25 PROCEDURE — 99214 OFFICE O/P EST MOD 30 MIN: CPT | Performed by: NURSE PRACTITIONER

## 2022-04-25 PROCEDURE — 69209 REMOVE IMPACTED EAR WAX UNI: CPT | Performed by: NURSE PRACTITIONER

## 2022-04-25 PROCEDURE — 87428 SARSCOV & INF VIR A&B AG IA: CPT | Performed by: NURSE PRACTITIONER

## 2022-04-25 RX ORDER — AMOXICILLIN 875 MG/1
875 TABLET, COATED ORAL 2 TIMES DAILY
Qty: 20 TABLET | Refills: 0 | Status: SHIPPED | OUTPATIENT
Start: 2022-04-25 | End: 2022-05-05

## 2022-04-25 RX ORDER — OSELTAMIVIR PHOSPHATE 75 MG/1
75 CAPSULE ORAL 2 TIMES DAILY
Qty: 10 CAPSULE | Refills: 0 | Status: SHIPPED | OUTPATIENT
Start: 2022-04-25 | End: 2022-04-30

## 2022-04-25 NOTE — PROGRESS NOTES
"Chief Complaint  Earache (Pt is here for a follow up regarding pain in both ears-pt still having pain)    Subjective          Angelica Reynaga presents to [facility]  History of Present Illness  Chills since yesterday  Reports feeling like she is getting sick with something.  She states that her daughters recently had a stomach bug.  She reports that several of her coworkers have also had various viruses.  Earache continued, she has been taking zyrtec and flonase  Denies cough, runny nose  She reports using the debrox drops only twice.  Objective   Vital Signs:   /68 (BP Location: Right arm, Patient Position: Sitting, Cuff Size: Adult)   Pulse 87   Temp 98.4 °F (36.9 °C) (Tympanic)   Resp 20   Ht 119.4 cm (47\")   Wt 71 kg (156 lb 9.6 oz)   SpO2 98%   BMI 49.84 kg/m²     Physical Exam  Vitals and nursing note reviewed.   Constitutional:       General: She is not in acute distress.     Appearance: Normal appearance.   HENT:      Head: Normocephalic and atraumatic.      Right Ear: External ear normal. There is impacted cerumen.      Left Ear: External ear normal. There is impacted cerumen.      Ears:      Comments: Following irrigation right TM erythematous, dull, mucoid effusion     Nose: Nose normal. No rhinorrhea.      Mouth/Throat:      Mouth: Mucous membranes are moist.   Eyes:      Conjunctiva/sclera: Conjunctivae normal.   Cardiovascular:      Rate and Rhythm: Normal rate and regular rhythm.      Pulses: Normal pulses.      Heart sounds: Normal heart sounds. No murmur heard.    No friction rub. No gallop.   Pulmonary:      Effort: Pulmonary effort is normal. No respiratory distress.      Breath sounds: No wheezing, rhonchi or rales.   Abdominal:      General: There is no distension.      Palpations: There is no mass.      Tenderness: There is no abdominal tenderness. There is no guarding.      Hernia: No hernia is present.   Musculoskeletal:      " Cervical back: Neck supple.      Right lower leg: No edema.      Left lower leg: No edema.   Skin:     General: Skin is warm and dry.   Neurological:      General: No focal deficit present.      Mental Status: She is alert and oriented to person, place, and time.   Psychiatric:         Mood and Affect: Mood normal.         Behavior: Behavior normal.        Result Review :          Ear Cerumen Removal    Date/Time: 4/25/2022 11:50 AM  Performed by: Shanda Jimenez APRN  Authorized by: Shanda Jimenez APRN     Anesthesia:  Local Anesthetic: none  Location details: left ear and right ear  Patient tolerance: patient tolerated the procedure well with no immediate complications  Procedure type: irrigation           Assessment and Plan    Diagnoses and all orders for this visit:    1. Influenza A (Primary)  Comments:  flu A pos. treatment with Tamiflu.  Discussed course and supportive care.    2. Chills  -     POCT SARS-CoV-2 Antigen GINO    3. Otalgia, bilateral  Comments:  cerumen impaction removed  Orders:  -     Cerumen Removal    4. Bilateral impacted cerumen  Comments:  irrigated in the clinic, tolerated well  Orders:  -     Cerumen Removal    5. Non-recurrent acute suppurative otitis media of right ear without spontaneous rupture of tympanic membrane  Comments:  Treating with amoxicillin    Other orders  -     amoxicillin (AMOXIL) 875 MG tablet; Take 1 tablet by mouth 2 (Two) Times a Day for 10 days.  Dispense: 20 tablet; Refill: 0  -     oseltamivir (Tamiflu) 75 MG capsule; Take 1 capsule by mouth 2 (Two) Times a Day for 5 days.  Dispense: 10 capsule; Refill: 0              Follow Up   Return if symptoms worsen or fail to improve.  Patient was given instructions and counseling regarding her condition or for health maintenance advice. Please see specific information pulled into the AVS if appropriate.

## 2022-05-24 ENCOUNTER — OFFICE VISIT (OUTPATIENT)
Dept: INTERNAL MEDICINE | Facility: CLINIC | Age: 27
End: 2022-05-24

## 2022-05-24 VITALS
OXYGEN SATURATION: 100 % | BODY MASS INDEX: 35.41 KG/M2 | DIASTOLIC BLOOD PRESSURE: 62 MMHG | TEMPERATURE: 96.3 F | HEART RATE: 86 BPM | HEIGHT: 55 IN | WEIGHT: 153 LBS | RESPIRATION RATE: 16 BRPM | SYSTOLIC BLOOD PRESSURE: 114 MMHG

## 2022-05-24 DIAGNOSIS — H61.23 CERUMEN DEBRIS ON TYMPANIC MEMBRANE OF BOTH EARS: ICD-10-CM

## 2022-05-24 DIAGNOSIS — J11.1 FLU: Primary | ICD-10-CM

## 2022-05-24 DIAGNOSIS — H66.001 NON-RECURRENT ACUTE SUPPURATIVE OTITIS MEDIA OF RIGHT EAR WITHOUT SPONTANEOUS RUPTURE OF TYMPANIC MEMBRANE: ICD-10-CM

## 2022-05-24 PROBLEM — O09.899 HISTORY OF PRETERM DELIVERY, CURRENTLY PREGNANT: Status: RESOLVED | Noted: 2020-01-01 | Resolved: 2022-05-24

## 2022-05-24 PROCEDURE — 99213 OFFICE O/P EST LOW 20 MIN: CPT | Performed by: NURSE PRACTITIONER

## 2022-05-24 NOTE — PROGRESS NOTES
"Chief Complaint  Follow-up (Ear Pain and Positive Flu- 3 weeks ago /Feeling better over all)    Subjective          Angelica Reynaga presents to [facility]  History of Present Illness  Had flu and ear infection about 3 wks ago  Feeling better now  Denies fever  Ears no longer hurt  Objective   Vital Signs:   /62 (BP Location: Left arm, Patient Position: Sitting, Cuff Size: Adult)   Pulse 86   Temp 96.3 °F (35.7 °C)   Resp 16   Ht 119.4 cm (47\")   Wt 69.4 kg (153 lb)   SpO2 100%   BMI 48.70 kg/m²     Physical Exam  Vitals and nursing note reviewed.   Constitutional:       General: She is not in acute distress.     Appearance: Normal appearance.   HENT:      Head: Normocephalic and atraumatic.      Right Ear: Tympanic membrane, ear canal and external ear normal.      Left Ear: Tympanic membrane, ear canal and external ear normal.      Ears:      Comments: Cerumen present in canal bilaterally     Nose: Nose normal. No rhinorrhea.      Mouth/Throat:      Mouth: Mucous membranes are moist.      Pharynx: No posterior oropharyngeal erythema.   Eyes:      Conjunctiva/sclera: Conjunctivae normal.   Cardiovascular:      Rate and Rhythm: Normal rate and regular rhythm.      Pulses: Normal pulses.      Heart sounds: Normal heart sounds. No murmur heard.    No friction rub. No gallop.   Pulmonary:      Effort: Pulmonary effort is normal. No respiratory distress.      Breath sounds: No wheezing, rhonchi or rales.   Musculoskeletal:      Cervical back: Neck supple.      Right lower leg: No edema.      Left lower leg: No edema.   Lymphadenopathy:      Cervical: No cervical adenopathy.   Skin:     General: Skin is warm and dry.   Neurological:      General: No focal deficit present.      Mental Status: She is alert and oriented to person, place, and time.   Psychiatric:         Mood and Affect: Mood normal.         Behavior: Behavior normal.        Result Review :        "   Procedures      Assessment and Plan    Diagnoses and all orders for this visit:    1. Flu (Primary)  Comments:  resolved    2. Non-recurrent acute suppurative otitis media of right ear without spontaneous rupture of tympanic membrane  Comments:  resolved    3. Cerumen debris on tympanic membrane of both ears  Comments:  discussed debrox drops              Follow Up   Return if symptoms worsen or fail to improve.  Patient was given instructions and counseling regarding her condition or for health maintenance advice. Please see specific information pulled into the AVS if appropriate.

## 2022-08-22 ENCOUNTER — OFFICE VISIT (OUTPATIENT)
Dept: OBSTETRICS AND GYNECOLOGY | Facility: CLINIC | Age: 27
End: 2022-08-22

## 2022-08-22 VITALS
WEIGHT: 158 LBS | DIASTOLIC BLOOD PRESSURE: 73 MMHG | HEIGHT: 55 IN | BODY MASS INDEX: 36.57 KG/M2 | SYSTOLIC BLOOD PRESSURE: 112 MMHG | HEART RATE: 75 BPM

## 2022-08-22 DIAGNOSIS — N89.8 VAGINAL ODOR: ICD-10-CM

## 2022-08-22 DIAGNOSIS — Z11.3 SCREEN FOR STD (SEXUALLY TRANSMITTED DISEASE): ICD-10-CM

## 2022-08-22 DIAGNOSIS — N64.3 GALACTORRHEA: ICD-10-CM

## 2022-08-22 DIAGNOSIS — N89.8 VAGINAL DISCHARGE: Primary | ICD-10-CM

## 2022-08-22 LAB
C TRACH RRNA CVX QL NAA+PROBE: NOT DETECTED
CANDIDA SPECIES: NEGATIVE
GARDNERELLA VAGINALIS: POSITIVE
HBV SURFACE AG SERPL QL IA: NORMAL
HCV AB SER DONR QL: NORMAL
HIV1+2 AB SER QL: NORMAL
N GONORRHOEA RRNA SPEC QL NAA+PROBE: NOT DETECTED
PROLACTIN SERPL-MCNC: 14.4 NG/ML (ref 4.79–23.3)
T PALLIDUM IGG SER QL: NORMAL
T VAGINALIS DNA VAG QL PROBE+SIG AMP: NEGATIVE

## 2022-08-22 PROCEDURE — 86803 HEPATITIS C AB TEST: CPT | Performed by: OBSTETRICS & GYNECOLOGY

## 2022-08-22 PROCEDURE — 99214 OFFICE O/P EST MOD 30 MIN: CPT | Performed by: OBSTETRICS & GYNECOLOGY

## 2022-08-22 PROCEDURE — 84146 ASSAY OF PROLACTIN: CPT | Performed by: OBSTETRICS & GYNECOLOGY

## 2022-08-22 PROCEDURE — G0432 EIA HIV-1/HIV-2 SCREEN: HCPCS | Performed by: OBSTETRICS & GYNECOLOGY

## 2022-08-22 PROCEDURE — 87491 CHLMYD TRACH DNA AMP PROBE: CPT | Performed by: OBSTETRICS & GYNECOLOGY

## 2022-08-22 PROCEDURE — 87510 GARDNER VAG DNA DIR PROBE: CPT | Performed by: OBSTETRICS & GYNECOLOGY

## 2022-08-22 PROCEDURE — 87340 HEPATITIS B SURFACE AG IA: CPT | Performed by: OBSTETRICS & GYNECOLOGY

## 2022-08-22 PROCEDURE — 86780 TREPONEMA PALLIDUM: CPT | Performed by: OBSTETRICS & GYNECOLOGY

## 2022-08-22 PROCEDURE — 87591 N.GONORRHOEAE DNA AMP PROB: CPT | Performed by: OBSTETRICS & GYNECOLOGY

## 2022-08-22 PROCEDURE — 87480 CANDIDA DNA DIR PROBE: CPT | Performed by: OBSTETRICS & GYNECOLOGY

## 2022-08-22 PROCEDURE — 87660 TRICHOMONAS VAGIN DIR PROBE: CPT | Performed by: OBSTETRICS & GYNECOLOGY

## 2022-08-22 NOTE — PROGRESS NOTES
"GYN Problem/Follow Up Visit    Chief Complaint   Patient presents with   • Vaginal Itching     DESIRES STD TESTING             HPI  Angelica Reynaga is a 27 y.o. female, , who presents for vaginal d/c and odor off and on for awhile. New partner and also desires full std screen. Also c/o intermittent milky nipple d/c from both nipples. States started about a month ago. Has not breast fed for two years. Denies d/c today. States can sometimes squeeze her nipples and it will come out.       Additional OB/GYN History   No LMP recorded. Patient has had an implant.  Current contraception: contraceptive methods: Nexplanon  Desires to: continue contraception  Allergies : Patient has no known allergies.     The additional following portions of the patient's history were reviewed and updated as appropriate: allergies, current medications, past family history, past medical history, past social history, past surgical history and problem list.    Review of Systems    I have reviewed and agree with the HPI, ROS, and historical information as entered above. Tory Rosas, APRN    Objective   /73   Pulse 75   Ht 119.4 cm (47\")   Wt 71.7 kg (158 lb)   BMI 50.29 kg/m²     Physical Exam  Vitals reviewed.   Genitourinary:     General: Normal vulva.      Vagina: No signs of injury and foreign body. Vaginal discharge (thin white), erythema and tenderness present. No bleeding, lesions or prolapsed vaginal walls.      Cervix: Discharge and erythema present. No cervical motion tenderness, friability, lesion or cervical bleeding.   Skin:     General: Skin is warm and dry.   Neurological:      Mental Status: She is alert and oriented to person, place, and time.            Assessment and Plan    Diagnoses and all orders for this visit:    1. Vaginal discharge (Primary)  -     Chlamydia trachomatis, Neisseria gonorrhoeae, PCR - Swab, Cervix  -     Gardnerella vaginalis, Trichomonas vaginalis, Candida albicans, DNA - Swab, " Vagina    2. Vaginal odor  -     Chlamydia trachomatis, Neisseria gonorrhoeae, PCR - Swab, Cervix  -     Gardnerella vaginalis, Trichomonas vaginalis, Candida albicans, DNA - Swab, Vagina    3. Screen for STD (sexually transmitted disease)  -     Chlamydia trachomatis, Neisseria gonorrhoeae, PCR - Swab, Cervix  -     Gardnerella vaginalis, Trichomonas vaginalis, Candida albicans, DNA - Swab, Vagina  -     Hepatitis B Surface Antigen  -     Hepatitis C Antibody  -     HIV-1 & HIV-2 Antibodies  -     T Pallidum Antibody (FTA-Ab)    4. Galactorrhea  -     Prolactin; Future  -     Prolactin    will check for infections. Discussed preventative measures. No breast or nipple stimulation for 24 hours before prolactin level drawn. Will f/u after that.     Counseling:  She understands the importance of having the above orders performed in a timely fashion.  She is encouraged to review her results online and/or contact or office if she has questions.     Follow Up:  Return if symptoms worsen or fail to improve.      Tory Rosas, CHEVY  08/22/2022

## 2022-08-23 RX ORDER — METRONIDAZOLE 500 MG/1
500 TABLET ORAL 2 TIMES DAILY
Qty: 14 TABLET | Refills: 0 | Status: SHIPPED | OUTPATIENT
Start: 2022-08-23 | End: 2022-08-30

## 2022-08-23 NOTE — PROGRESS NOTES
Discussed results with patient. Patient is aware that a prescription was sent to her pharmacy for treatment.

## 2022-08-24 ENCOUNTER — LAB (OUTPATIENT)
Dept: OBSTETRICS AND GYNECOLOGY | Facility: CLINIC | Age: 27
End: 2022-08-24

## 2022-12-01 ENCOUNTER — OFFICE VISIT (OUTPATIENT)
Dept: INTERNAL MEDICINE | Facility: CLINIC | Age: 27
End: 2022-12-01

## 2022-12-01 VITALS
OXYGEN SATURATION: 98 % | TEMPERATURE: 97.1 F | DIASTOLIC BLOOD PRESSURE: 62 MMHG | RESPIRATION RATE: 18 BRPM | BODY MASS INDEX: 51.88 KG/M2 | HEART RATE: 88 BPM | SYSTOLIC BLOOD PRESSURE: 116 MMHG | WEIGHT: 163 LBS

## 2022-12-01 DIAGNOSIS — Z00.00 ANNUAL PHYSICAL EXAM: Primary | ICD-10-CM

## 2022-12-01 DIAGNOSIS — F41.9 ANXIETY: ICD-10-CM

## 2022-12-01 DIAGNOSIS — J02.9 SORE THROAT: ICD-10-CM

## 2022-12-01 DIAGNOSIS — F33.0 MILD EPISODE OF RECURRENT MAJOR DEPRESSIVE DISORDER: ICD-10-CM

## 2022-12-01 DIAGNOSIS — M25.511 ACUTE PAIN OF RIGHT SHOULDER: ICD-10-CM

## 2022-12-01 LAB
ALBUMIN SERPL-MCNC: 4.3 G/DL (ref 3.5–5.2)
ALBUMIN/GLOB SERPL: 1.4 G/DL
ALP SERPL-CCNC: 65 U/L (ref 39–117)
ALT SERPL W P-5'-P-CCNC: 24 U/L (ref 1–33)
ANION GAP SERPL CALCULATED.3IONS-SCNC: 10.3 MMOL/L (ref 5–15)
AST SERPL-CCNC: 17 U/L (ref 1–32)
BASOPHILS # BLD AUTO: 0.03 10*3/MM3 (ref 0–0.2)
BASOPHILS NFR BLD AUTO: 0.3 % (ref 0–1.5)
BILIRUB SERPL-MCNC: 0.3 MG/DL (ref 0–1.2)
BUN SERPL-MCNC: 12 MG/DL (ref 6–20)
BUN/CREAT SERPL: 19 (ref 7–25)
CALCIUM SPEC-SCNC: 9.4 MG/DL (ref 8.6–10.5)
CHLORIDE SERPL-SCNC: 106 MMOL/L (ref 98–107)
CHOLEST SERPL-MCNC: 184 MG/DL (ref 0–200)
CO2 SERPL-SCNC: 26.7 MMOL/L (ref 22–29)
CREAT SERPL-MCNC: 0.63 MG/DL (ref 0.57–1)
DEPRECATED RDW RBC AUTO: 39.5 FL (ref 37–54)
EGFRCR SERPLBLD CKD-EPI 2021: 125.7 ML/MIN/1.73
EOSINOPHIL # BLD AUTO: 0.44 10*3/MM3 (ref 0–0.4)
EOSINOPHIL NFR BLD AUTO: 4 % (ref 0.3–6.2)
ERYTHROCYTE [DISTWIDTH] IN BLOOD BY AUTOMATED COUNT: 12.7 % (ref 12.3–15.4)
EXPIRATION DATE: NORMAL
GLOBULIN UR ELPH-MCNC: 3 GM/DL
GLUCOSE SERPL-MCNC: 81 MG/DL (ref 65–99)
HCT VFR BLD AUTO: 40.7 % (ref 34–46.6)
HDLC SERPL-MCNC: 48 MG/DL (ref 40–60)
HGB BLD-MCNC: 13.3 G/DL (ref 12–15.9)
IMM GRANULOCYTES # BLD AUTO: 0.03 10*3/MM3 (ref 0–0.05)
IMM GRANULOCYTES NFR BLD AUTO: 0.3 % (ref 0–0.5)
INTERNAL CONTROL: NORMAL
LDLC SERPL CALC-MCNC: 116 MG/DL (ref 0–100)
LDLC/HDLC SERPL: 2.36 {RATIO}
LYMPHOCYTES # BLD AUTO: 3.2 10*3/MM3 (ref 0.7–3.1)
LYMPHOCYTES NFR BLD AUTO: 29.2 % (ref 19.6–45.3)
Lab: NORMAL
MCH RBC QN AUTO: 28.1 PG (ref 26.6–33)
MCHC RBC AUTO-ENTMCNC: 32.7 G/DL (ref 31.5–35.7)
MCV RBC AUTO: 86 FL (ref 79–97)
MONOCYTES # BLD AUTO: 0.75 10*3/MM3 (ref 0.1–0.9)
MONOCYTES NFR BLD AUTO: 6.8 % (ref 5–12)
NEUTROPHILS NFR BLD AUTO: 59.4 % (ref 42.7–76)
NEUTROPHILS NFR BLD AUTO: 6.52 10*3/MM3 (ref 1.7–7)
NRBC BLD AUTO-RTO: 0 /100 WBC (ref 0–0.2)
PLATELET # BLD AUTO: 317 10*3/MM3 (ref 140–450)
PMV BLD AUTO: 9.7 FL (ref 6–12)
POTASSIUM SERPL-SCNC: 4.3 MMOL/L (ref 3.5–5.2)
PROT SERPL-MCNC: 7.3 G/DL (ref 6–8.5)
RBC # BLD AUTO: 4.73 10*6/MM3 (ref 3.77–5.28)
S PYO AG THROAT QL: NEGATIVE
SODIUM SERPL-SCNC: 143 MMOL/L (ref 136–145)
TRIGL SERPL-MCNC: 113 MG/DL (ref 0–150)
TSH SERPL DL<=0.05 MIU/L-ACNC: 3.2 UIU/ML (ref 0.27–4.2)
VLDLC SERPL-MCNC: 20 MG/DL (ref 5–40)
WBC NRBC COR # BLD: 10.97 10*3/MM3 (ref 3.4–10.8)

## 2022-12-01 PROCEDURE — 99213 OFFICE O/P EST LOW 20 MIN: CPT | Performed by: NURSE PRACTITIONER

## 2022-12-01 PROCEDURE — 80053 COMPREHEN METABOLIC PANEL: CPT | Performed by: NURSE PRACTITIONER

## 2022-12-01 PROCEDURE — 99395 PREV VISIT EST AGE 18-39: CPT | Performed by: NURSE PRACTITIONER

## 2022-12-01 PROCEDURE — 80061 LIPID PANEL: CPT | Performed by: NURSE PRACTITIONER

## 2022-12-01 PROCEDURE — 36415 COLL VENOUS BLD VENIPUNCTURE: CPT | Performed by: NURSE PRACTITIONER

## 2022-12-01 PROCEDURE — 87081 CULTURE SCREEN ONLY: CPT | Performed by: NURSE PRACTITIONER

## 2022-12-01 PROCEDURE — 84443 ASSAY THYROID STIM HORMONE: CPT | Performed by: NURSE PRACTITIONER

## 2022-12-01 PROCEDURE — 87880 STREP A ASSAY W/OPTIC: CPT | Performed by: NURSE PRACTITIONER

## 2022-12-01 PROCEDURE — 85025 COMPLETE CBC W/AUTO DIFF WBC: CPT | Performed by: NURSE PRACTITIONER

## 2022-12-01 RX ORDER — SERTRALINE HYDROCHLORIDE 25 MG/1
25 TABLET, FILM COATED ORAL DAILY
COMMUNITY

## 2022-12-01 NOTE — PROGRESS NOTES
Chief Complaint  Shoulder Pain (Right shoulder- 3 months), Annual Exam, Sore Throat (Tonsils swollen more frequently), and Depression (Screening High/OB prescribed Zoloft)    Subjective          Angelica Reynaga presents to [facility]  History of Present Illness  Shoulder pain-started about 3 mths ago  Injury when lifting daughter, external rotation, ongoing since that time  Occasional numbness and tingling at night  No improvement with rest.  Used ibuprofen some with out significant improvement    Sore throat  Hx of strep  Frequently having tonsillar swelling and painful  Improved after few wks.  Was not evaluated at that time due to lack of lymphnode swelling    Depression/anxiety-started on zoloft post partum. Had stopped for a few mths  Restarted this 1 wk ago. Has previously worked well for her  Denies SI/HI    Declines flu vaccine  Objective   Vital Signs:     /62 (BP Location: Right arm, Patient Position: Sitting, Cuff Size: Adult)   Pulse 88   Temp 97.1 °F (36.2 °C)   Resp 18   Wt 73.9 kg (163 lb)   SpO2 98%   BMI 51.88 kg/m²     Physical Exam  Vitals and nursing note reviewed.   Constitutional:       General: She is not in acute distress.     Appearance: Normal appearance.   HENT:      Head: Normocephalic and atraumatic.      Right Ear: Tympanic membrane, ear canal and external ear normal.      Left Ear: Tympanic membrane, ear canal and external ear normal.      Nose: Nose normal.      Mouth/Throat:      Mouth: Mucous membranes are moist.      Comments: Tonsils enlarged without erythema or exudate  Eyes:      Conjunctiva/sclera: Conjunctivae normal.   Cardiovascular:      Rate and Rhythm: Normal rate and regular rhythm.      Pulses: Normal pulses.      Heart sounds: Normal heart sounds. No murmur heard.    No friction rub. No gallop.   Pulmonary:      Effort: Pulmonary effort is normal. No respiratory distress.      Breath sounds: No wheezing,  rhonchi or rales.   Musculoskeletal:      Right shoulder: No bony tenderness. Decreased range of motion.      Cervical back: Neck supple.   Lymphadenopathy:      Cervical: No cervical adenopathy.   Skin:     General: Skin is warm and dry.   Neurological:      General: No focal deficit present.      Mental Status: She is alert and oriented to person, place, and time.   Psychiatric:         Mood and Affect: Mood normal.         Behavior: Behavior normal.        Result Review :          Procedures      Assessment and Plan    Diagnoses and all orders for this visit:    1. Annual physical exam (Primary)  Comments:  Checking labs today.  Orders:  -     Comprehensive Metabolic Panel  -     CBC & Differential  -     TSH  -     Lipid Panel    2. Acute pain of right shoulder  Comments:  She will get x-ray of her right shoulder.  Consider MRI in the future given that pain has persisted.  She will do exercises at home for this.  Orders:  -     XR Shoulder 2+ View Right; Future    3. Sore throat  Comments:  Swab for strep today.  Discussed symptoms likely secondary to viral pharyngitis if strep negative.  Orders:  -     POC Rapid Strep A  -     Beta Strep Culture, Throat - Swab, Throat    4. Mild episode of recurrent major depressive disorder (HCC)    5. Anxiety  Comments:  Recently restarted Zoloft.  Will reassess in 6 weeks.  Discussed risk for worsening depression and SI.        19 to 39: Counseling/Anticipatory Guidance Discussed: nutrition, family planning/contraception, physical activity, healthy weight, dental health, mental health and immunizations      Follow Up   Return in about 6 weeks (around 1/12/2023).  Patient was given instructions and counseling regarding her condition or for health maintenance advice. Please see specific information pulled into the AVS if appropriate.

## 2022-12-03 LAB — BACTERIA SPEC AEROBE CULT: NORMAL

## 2023-10-02 ENCOUNTER — OFFICE VISIT (OUTPATIENT)
Dept: OBSTETRICS AND GYNECOLOGY | Facility: CLINIC | Age: 28
End: 2023-10-02
Payer: COMMERCIAL

## 2023-10-02 VITALS
HEIGHT: 55 IN | SYSTOLIC BLOOD PRESSURE: 113 MMHG | HEART RATE: 76 BPM | DIASTOLIC BLOOD PRESSURE: 78 MMHG | BODY MASS INDEX: 37.03 KG/M2 | WEIGHT: 160 LBS

## 2023-10-02 DIAGNOSIS — Z30.46 NEXPLANON REMOVAL: Primary | ICD-10-CM

## 2023-10-02 NOTE — PROGRESS NOTES
Procedures  Nexplanon Removal    Date of Insertion:  known  Date of Removal:  October 2, 2023    Information related to removal of the implant:   Reason(s) for removal:  Irregular bleeding  Was implant palpable before removal?  Yes    Procedure Time Out Documentation  The risks of the procedure were reviewed with the patient including bleeding, infection and unlikely damage to the uterus and the benefits of the procedure were explained to the patient and Written informed consent was obtained      Procedure:    Implant identified.  Left upper arm prepped with Betadinex3.  None, 1% lidocaine injected at planned incision site.      A vertical incision 3 mm was performed with scalpel at the distal end of implant.  The implant was removed using a hemostat. The implant was inspected and found to be intact and complete.  Steri strips and a pressure dressing were applied to the site.  After removal instructions were given and verbally reviewed with the patient who acknowledged her understanding.    Difficulties with the implant removal procedure?  no    Patient tolerated the procedure well without complications.    oTry Rosas, [place]  10/2/2023  13:58 EDT

## 2023-10-03 ENCOUNTER — OFFICE VISIT (OUTPATIENT)
Dept: OBSTETRICS AND GYNECOLOGY | Facility: CLINIC | Age: 28
End: 2023-10-03
Payer: COMMERCIAL

## 2023-10-03 VITALS
WEIGHT: 161 LBS | HEIGHT: 55 IN | HEART RATE: 72 BPM | DIASTOLIC BLOOD PRESSURE: 72 MMHG | SYSTOLIC BLOOD PRESSURE: 109 MMHG | BODY MASS INDEX: 37.26 KG/M2

## 2023-10-03 DIAGNOSIS — Z01.419 ENCOUNTER FOR GYNECOLOGICAL EXAMINATION WITHOUT ABNORMAL FINDING: Primary | ICD-10-CM

## 2023-10-03 PROCEDURE — G0123 SCREEN CERV/VAG THIN LAYER: HCPCS

## 2023-10-03 NOTE — PROGRESS NOTES
"Well Woman Visit    CC: Annual well woman exam       HPI:   28 y.o. Contraception or HRT: Contraception:  None  Menses:  nexplanon removed yesterday due to aub  Pain:  Moderate, not too bad  Incontinence concerns: No  Hx of abnormal pap:  No  Pt has no complaints today.      History: PMHx, Meds, Allergies, PSHx, Social Hx, and POBHx all reviewed and updated.      PHYSICAL EXAM:  /72   Pulse 72   Ht 119.4 cm (47\")   Wt 73 kg (161 lb)   BMI 51.24 kg/m²   General- NAD, alert and oriented, appropriate  Psych- Normal mood, good memory  Neck- No masses, no thyroid enlargement  CV- Regular rhythm, no murnurs  Resp- CTA to bases, no wheezes  Abdomen- Soft, non distended, non tender, no masses    Breast left-  Bilaterally symmetrical, no masses, non tender, no nipple discharge  Breast right- Bilaterally symmetrical, no masses, non tender, no nipple discharge    External genitalia- Normal female, no lesions  Urethra/meatus- Normal, no masses, non tender, no prolapse  Bladder- Normal, no masses, non tender, no prolapse  Vagina- Normal, no atrophy, no lesions, no discharge, no prolapse, on menses  Cvx- Normal, no lesions, no discharge, No cervical motion tenderness  Uterus- Normal size, shape & consistency.  Non tender, mobile, & no prolapse  Adnexa- No mass, non tender  Anus/Rectum/Perineum- Not performed    Lymphatic- No palpable neck, axillary, or groin nodes  Ext- No edema, no cyanosis    Skin- No lesions, no rashes, no acanthosis nigricans        ASSESSMENT and PLAN:  WWE    Diagnoses and all orders for this visit:    1. Encounter for gynecological examination without abnormal finding (Primary)  -     IGP,rfx Aptima HPV All Pth    Declines bc. She will f/u if aub and/or cramping continue over the next few weeks since nexplanon was just removed yesterday.     Counseling:     Track menses, RTO IF <q21d, >7d long, or heavy  PNV    Domestic violence/abuse screen: negative    Depression screen: no " SI    Preventative:   BREAST HEALTH- Monthly self breast exam importance and how to reviewed. MMG and/or MRI (prn) reviewed per society guidelines and her individual history. Mammo/MRI screen: Not medically needed.  CERVICAL CANCER Screening- Reviewed current ASCCP guidelines for screening w and wo cotest HPV, age specific.  Screen: Updated today.  COLON CANCER Screening- Reviewed current medical society guidelines and options.  Colonoscopy screen:  Not medically needed.  SEXUAL HEALTH: Declines STD screening.  VACCINATIONS Recommended: Flu annually, Gardisil/HPV vaccine (up to 46yo).  Importance discussed, risk being unvaccinated reviewed.  Questions answered  Smoking status- NON SMOKER.  Importance of avoiding second hand smoke.  [employer]: Counseled and evaluated for hereditary cancer testing. Testing declined.  Gardasil status: completed.      She understands the importance of having any ordered tests to be performed in a timely fashion.  She is encouraged to review her results online and/or contact or office if she has questions.     Follow Up:  Return if symptoms worsen or fail to improve.      Tory Rosas, APRN  10/03/2023

## 2023-10-06 LAB
CONV .: NORMAL
CYTOLOGIST CVX/VAG CYTO: NORMAL
CYTOLOGY CVX/VAG DOC CYTO: NORMAL
CYTOLOGY CVX/VAG DOC THIN PREP: NORMAL
DX ICD CODE: NORMAL
HIV 1 & 2 AB SER-IMP: NORMAL
OTHER STN SPEC: NORMAL
STAT OF ADQ CVX/VAG CYTO-IMP: NORMAL

## 2023-12-18 PROCEDURE — 87081 CULTURE SCREEN ONLY: CPT | Performed by: FAMILY MEDICINE

## 2023-12-18 PROCEDURE — 87147 CULTURE TYPE IMMUNOLOGIC: CPT | Performed by: FAMILY MEDICINE

## 2024-01-08 ENCOUNTER — OFFICE VISIT (OUTPATIENT)
Dept: OBSTETRICS AND GYNECOLOGY | Facility: CLINIC | Age: 29
End: 2024-01-08
Payer: COMMERCIAL

## 2024-01-08 VITALS
DIASTOLIC BLOOD PRESSURE: 80 MMHG | HEART RATE: 87 BPM | HEIGHT: 59 IN | WEIGHT: 156 LBS | BODY MASS INDEX: 31.45 KG/M2 | SYSTOLIC BLOOD PRESSURE: 117 MMHG

## 2024-01-08 DIAGNOSIS — N83.201 CYST OF RIGHT OVARY: Primary | ICD-10-CM

## 2024-01-08 PROCEDURE — 1159F MED LIST DOCD IN RCRD: CPT | Performed by: OBSTETRICS & GYNECOLOGY

## 2024-01-08 PROCEDURE — 99213 OFFICE O/P EST LOW 20 MIN: CPT | Performed by: OBSTETRICS & GYNECOLOGY

## 2024-01-08 PROCEDURE — 1160F RVW MEDS BY RX/DR IN RCRD: CPT | Performed by: OBSTETRICS & GYNECOLOGY

## 2024-01-08 NOTE — PROGRESS NOTES
"GYN Problem/Follow Up Visit    Chief Complaint   Patient presents with    Discuss ovarian cyst           HPI  Angelica Reynaga is a 28 y.o. female, , who presents for ovarian cyst f/u. Seen in er 23 and dx with right ovarian cyst and advised to f/u here. Pt states she has had ovarian cysts before. Had nexplanon removed in October due to aub. States after removal she had four days of heavy bleeding and started having pelvic pain. States the pain was intermittent. Became really intense and had to go to er. States was given pain pills and they have helped a lot. Still having intermittent pelvic pain but not as bad. Having some intermittent bleeding since nexplanon was removed but does not seem to be regular.        Additional OB/GYN History   Patient's last menstrual period was 2023 (approximate).  Current contraception: contraceptive methods: Condoms  Desires to: continue contraception  Allergies : Patient has no known allergies.     The additional following portions of the patient's history were reviewed and updated as appropriate: allergies, current medications, past family history, past medical history, past social history, past surgical history, and problem list.    Review of Systems    I have reviewed and agree with the HPI, ROS, and historical information as entered above. oTry Rosas, APRN    Objective   /80   Pulse 87   Ht 149.9 cm (59\")   Wt 70.8 kg (156 lb)   LMP 2023 (Approximate)   BMI 31.51 kg/m²     Physical Exam  Vitals reviewed.   Neurological:      Mental Status: She is alert and oriented to person, place, and time.            Assessment and Plan    Diagnoses and all orders for this visit:    1. Cyst of right ovary (Primary)  -     US Non-ob Transvaginal; Future    Reviewed cat scan done 23: 4.7 cm right ovarian cyst, possibly hemorrhagic. Will check pelvic u/s. Also discussed bc options to help regulate bleeding and possibly help control growth of cysts. Pt will " consider options and f/u after u/s, sooner if any concerns or worsening sx.     Counseling:  She understands the importance of having the above orders performed in a timely fashion.  She is encouraged to review her results online and/or contact or office if she has questions.     Follow Up:  Return for u/s f/u.      Tory Rosas, [place]  01/08/2024

## 2024-01-22 ENCOUNTER — HOSPITAL ENCOUNTER (OUTPATIENT)
Dept: ULTRASOUND IMAGING | Facility: HOSPITAL | Age: 29
Discharge: HOME | End: 2024-01-22
Admitting: OBSTETRICS & GYNECOLOGY
Payer: COMMERCIAL

## 2024-01-22 DIAGNOSIS — N83.201 CYST OF RIGHT OVARY: ICD-10-CM

## 2024-01-22 PROCEDURE — 76830 TRANSVAGINAL US NON-OB: CPT

## 2024-01-23 ENCOUNTER — TELEPHONE (OUTPATIENT)
Dept: OBSTETRICS AND GYNECOLOGY | Facility: CLINIC | Age: 29
End: 2024-01-23
Payer: COMMERCIAL

## 2024-01-29 ENCOUNTER — OFFICE VISIT (OUTPATIENT)
Dept: OBSTETRICS AND GYNECOLOGY | Facility: CLINIC | Age: 29
End: 2024-01-29
Payer: COMMERCIAL

## 2024-01-29 VITALS
SYSTOLIC BLOOD PRESSURE: 99 MMHG | DIASTOLIC BLOOD PRESSURE: 65 MMHG | BODY MASS INDEX: 31.25 KG/M2 | HEART RATE: 92 BPM | WEIGHT: 155 LBS | HEIGHT: 59 IN

## 2024-01-29 DIAGNOSIS — R10.2 PELVIC PAIN IN FEMALE: Primary | ICD-10-CM

## 2024-01-29 DIAGNOSIS — N83.201 CYSTS OF BOTH OVARIES: ICD-10-CM

## 2024-01-29 DIAGNOSIS — N83.202 CYSTS OF BOTH OVARIES: ICD-10-CM

## 2024-01-29 PROCEDURE — 1159F MED LIST DOCD IN RCRD: CPT | Performed by: OBSTETRICS & GYNECOLOGY

## 2024-01-29 PROCEDURE — 1160F RVW MEDS BY RX/DR IN RCRD: CPT | Performed by: OBSTETRICS & GYNECOLOGY

## 2024-01-29 PROCEDURE — 99212 OFFICE O/P EST SF 10 MIN: CPT | Performed by: OBSTETRICS & GYNECOLOGY

## 2024-01-29 NOTE — PROGRESS NOTES
"GYN Problem/Follow Up Visit    Chief Complaint   Patient presents with    FOLLOW UP ULTRASOUND           HPI  Angelica Reynaga is a 28 y.o. female, , who presents for u/s f/u. Had a hemorrhagic right ovarian cyst we were monitoring and she is here to discuss the f/u u/s she had. No new concerns. Does still have some intermittent pelvic pain.        Additional OB/GYN History   Patient's last menstrual period was 2024.  Current contraception: contraceptive methods: None  Allergies : Patient has no known allergies.     The additional following portions of the patient's history were reviewed and updated as appropriate: allergies, current medications, past family history, past medical history, past social history, past surgical history, and problem list.    Review of Systems    I have reviewed and agree with the HPI, ROS, and historical information as entered above. Tory Rosas, APRN    Objective   BP 99/65   Pulse 92   Ht 149.9 cm (59\")   Wt 70.3 kg (155 lb)   LMP 2024   BMI 31.31 kg/m²     Physical Exam  Vitals reviewed.   Neurological:      Mental Status: She is alert and oriented to person, place, and time.            Assessment and Plan    Diagnoses and all orders for this visit:    1. Pelvic pain in female (Primary)    2. Cysts of both ovaries    Reviewed pelvic u/s done 24: the right ovarian cyst has resolved and now there is a 4.2 cm simple left ovarian cyst. Also noted was a possible calcified focus in the right kidney. Discussed possibility of kidney stone and recommend f/u with pcp. Also discussed that she may be developing ovarian cysts frequently which could be the cause of her pelvic pain. She also had her nexplanon removed in October which could be causing some hormonal changes and cyst growth. Discussed starting back on hormonal meds to control the growth of the cysts and help with the pain. She states the pain is manageable and she declines any tx for now. She desires to monitor " her sx and f/u prn.    Counseling:  She understands the importance of having the above orders performed in a timely fashion.  She is encouraged to review her results online and/or contact or office if she has questions.     Follow Up:  Return for Annual physical.      Tory Rosas, APRN  01/29/2024

## 2024-03-05 ENCOUNTER — LAB (OUTPATIENT)
Dept: LAB | Facility: HOSPITAL | Age: 29
End: 2024-03-05
Payer: COMMERCIAL

## 2024-03-05 ENCOUNTER — OFFICE VISIT (OUTPATIENT)
Dept: OBSTETRICS AND GYNECOLOGY | Facility: CLINIC | Age: 29
End: 2024-03-05
Payer: COMMERCIAL

## 2024-03-05 VITALS
SYSTOLIC BLOOD PRESSURE: 105 MMHG | HEIGHT: 59 IN | HEART RATE: 75 BPM | DIASTOLIC BLOOD PRESSURE: 64 MMHG | BODY MASS INDEX: 31.25 KG/M2 | WEIGHT: 155 LBS

## 2024-03-05 DIAGNOSIS — N92.6 LATE MENSES: ICD-10-CM

## 2024-03-05 DIAGNOSIS — Z11.3 SCREEN FOR STD (SEXUALLY TRANSMITTED DISEASE): ICD-10-CM

## 2024-03-05 DIAGNOSIS — N89.8 VAGINAL DISCHARGE: Primary | ICD-10-CM

## 2024-03-05 LAB
HBV SURFACE AG SERPL QL IA: NORMAL
HCG INTACT+B SERPL-ACNC: <1 MIU/ML
HCV AB SER DONR QL: NORMAL
HIV 1+2 AB+HIV1 P24 AG SERPL QL IA: NORMAL
T PALLIDUM IGG SER QL: NORMAL

## 2024-03-05 PROCEDURE — 84702 CHORIONIC GONADOTROPIN TEST: CPT

## 2024-03-05 PROCEDURE — 1159F MED LIST DOCD IN RCRD: CPT | Performed by: OBSTETRICS & GYNECOLOGY

## 2024-03-05 PROCEDURE — 86780 TREPONEMA PALLIDUM: CPT

## 2024-03-05 PROCEDURE — 36415 COLL VENOUS BLD VENIPUNCTURE: CPT

## 2024-03-05 PROCEDURE — G0432 EIA HIV-1/HIV-2 SCREEN: HCPCS

## 2024-03-05 PROCEDURE — 1160F RVW MEDS BY RX/DR IN RCRD: CPT | Performed by: OBSTETRICS & GYNECOLOGY

## 2024-03-05 PROCEDURE — 87340 HEPATITIS B SURFACE AG IA: CPT

## 2024-03-05 PROCEDURE — 86803 HEPATITIS C AB TEST: CPT

## 2024-03-05 PROCEDURE — 99213 OFFICE O/P EST LOW 20 MIN: CPT | Performed by: OBSTETRICS & GYNECOLOGY

## 2024-03-05 NOTE — PROGRESS NOTES
"GYN Problem/Follow Up Visit    Chief Complaint   Patient presents with    Exposure to STD           HPI  Angelica Reynaga is a 28 y.o. female, , who presents for increased vaginal d/c and std screen. Denies odor or itching. States two new sex partners and did not use protection with one. Desires swab and blood work. Also states missed February menses and requests blood pregnancy test. Took home preg test which was negative.        Additional OB/GYN History   Patient's last menstrual period was 2024.  Current contraception: contraceptive methods: None  Desires to: do not start contraception  Allergies : Patient has no known allergies.     The additional following portions of the patient's history were reviewed and updated as appropriate: allergies, current medications, past family history, past medical history, past social history, past surgical history, and problem list.    Review of Systems    I have reviewed and agree with the HPI, ROS, and historical information as entered above. Tory Rosas, APRN    Objective   /64   Pulse 75   Ht 149.9 cm (59\")   Wt 70.3 kg (155 lb)   LMP 2024   BMI 31.31 kg/m²     Physical Exam  Vitals reviewed.   Genitourinary:     General: Normal vulva.      Vagina: Normal.      Cervix: Normal.   Skin:     General: Skin is warm and dry.   Neurological:      Mental Status: She is alert and oriented to person, place, and time.            Assessment and Plan    Diagnoses and all orders for this visit:    1. Vaginal discharge (Primary)  -     NuSwab VG+ - Swab, Vagina    2. Screen for STD (sexually transmitted disease)  -     Hepatitis B Surface Antigen; Future  -     Hepatitis C Antibody; Future  -     HIV-1 & HIV-2 Antibodies; Future  -     T Pallidum Antibody w/ reflex RPR (Syphilis); Future  -     NuSwab VG+ - Swab, Vagina    3. Late menses  -     hCG, Quantitative, Pregnancy; Future    Will check for infections. Beta ordered. Rto if menses continue to be " irreg/skipping.     Counseling:  She understands the importance of having the above orders performed in a timely fashion.  She is encouraged to review her results online and/or contact or office if she has questions.     Follow Up:  Return if symptoms worsen or fail to improve.      Tory Rosas, [place]Y  03/05/2024

## 2024-03-07 ENCOUNTER — TELEPHONE (OUTPATIENT)
Dept: OBSTETRICS AND GYNECOLOGY | Facility: CLINIC | Age: 29
End: 2024-03-07
Payer: COMMERCIAL

## 2024-03-07 LAB
A VAGINAE DNA VAG QL NAA+PROBE: ABNORMAL SCORE
BVAB2 DNA VAG QL NAA+PROBE: ABNORMAL SCORE
C ALBICANS DNA VAG QL NAA+PROBE: NEGATIVE
C GLABRATA DNA VAG QL NAA+PROBE: NEGATIVE
C TRACH DNA VAG QL NAA+PROBE: NEGATIVE
MEGA1 DNA VAG QL NAA+PROBE: ABNORMAL SCORE
N GONORRHOEA DNA VAG QL NAA+PROBE: NEGATIVE
T VAGINALIS DNA VAG QL NAA+PROBE: NEGATIVE

## 2024-03-07 RX ORDER — METRONIDAZOLE 500 MG/1
500 TABLET ORAL 2 TIMES DAILY
Qty: 14 TABLET | Refills: 0 | Status: SHIPPED | OUTPATIENT
Start: 2024-03-07 | End: 2024-03-14

## 2024-03-07 NOTE — TELEPHONE ENCOUNTER
----- Message from CHEVY Quan sent at 3/7/2024  8:22 AM EST -----  Please discuss results with pt. Flagyl sent. Thanks

## 2024-05-16 ENCOUNTER — TELEPHONE (OUTPATIENT)
Dept: INTERNAL MEDICINE | Facility: CLINIC | Age: 29
End: 2024-05-16

## 2024-05-16 NOTE — TELEPHONE ENCOUNTER
Caller: Angelica Reynaga    Relationship: Self    Best call back number: [phone]     What medication are you requesting: ANTIBIOTIC    What are your current symptoms: BURNING/FREQUENT URINATION    How long have you been experiencing symptoms: ONE DAY    Have you had these symptoms before:    [] Yes  [x] No    Have you been treated for these symptoms before:   [] Yes  [x] No    If a prescription is needed, what is your preferred pharmacy and phone number: [address] [phone]  - [phone]

## 2024-05-16 NOTE — TELEPHONE ENCOUNTER
Called and spoke with pt, informed pt she would need an apt with provider, pt requested today explained to pt I did not have any same day with provider today, explained to pt provider was out of the office tomorrow, pt asked about Monday, I explained to pt I had an afternoon apt, pt scheduled with provider for Monday afternoon.

## 2024-05-17 ENCOUNTER — HOSPITAL ENCOUNTER (EMERGENCY)
Facility: HOSPITAL | Age: 29
Discharge: HOME | End: 2024-05-17
Attending: EMERGENCY MEDICINE
Payer: MEDICAID

## 2024-05-17 ENCOUNTER — APPOINTMENT (OUTPATIENT)
Dept: CT IMAGING | Facility: HOSPITAL | Age: 29
End: 2024-05-17
Payer: MEDICAID

## 2024-05-17 VITALS
BODY MASS INDEX: 30 KG/M2 | TEMPERATURE: 98 F | RESPIRATION RATE: 14 BRPM | SYSTOLIC BLOOD PRESSURE: 103 MMHG | OXYGEN SATURATION: 99 % | HEIGHT: 59 IN | DIASTOLIC BLOOD PRESSURE: 67 MMHG | WEIGHT: 148.81 LBS | HEART RATE: 62 BPM

## 2024-05-17 DIAGNOSIS — N39.0 ACUTE UTI: Primary | ICD-10-CM

## 2024-05-17 LAB
ALBUMIN SERPL-MCNC: 3.8 G/DL (ref 3.5–5.2)
ALBUMIN/GLOB SERPL: 1.1 G/DL
ALP SERPL-CCNC: 62 U/L (ref 39–117)
ALT SERPL W P-5'-P-CCNC: 31 U/L (ref 1–33)
ANION GAP SERPL CALCULATED.3IONS-SCNC: 9.9 MMOL/L (ref 5–15)
AST SERPL-CCNC: 14 U/L (ref 1–32)
BACTERIA UR QL AUTO: ABNORMAL /HPF
BASOPHILS # BLD AUTO: 0.05 10*3/MM3 (ref 0–0.2)
BASOPHILS NFR BLD AUTO: 0.3 % (ref 0–1.5)
BILIRUB SERPL-MCNC: 0.2 MG/DL (ref 0–1.2)
BILIRUB UR QL STRIP: NEGATIVE
BUN SERPL-MCNC: 10 MG/DL (ref 6–20)
BUN/CREAT SERPL: 15.2 (ref 7–25)
CALCIUM SPEC-SCNC: 8.6 MG/DL (ref 8.6–10.5)
CHLORIDE SERPL-SCNC: 104 MMOL/L (ref 98–107)
CLARITY UR: ABNORMAL
CO2 SERPL-SCNC: 25.1 MMOL/L (ref 22–29)
COLOR UR: ABNORMAL
CREAT SERPL-MCNC: 0.66 MG/DL (ref 0.57–1)
DEPRECATED RDW RBC AUTO: 41.3 FL (ref 37–54)
EGFRCR SERPLBLD CKD-EPI 2021: 122.7 ML/MIN/1.73
EOSINOPHIL # BLD AUTO: 0.48 10*3/MM3 (ref 0–0.4)
EOSINOPHIL NFR BLD AUTO: 3 % (ref 0.3–6.2)
ERYTHROCYTE [DISTWIDTH] IN BLOOD BY AUTOMATED COUNT: 13.2 % (ref 12.3–15.4)
GLOBULIN UR ELPH-MCNC: 3.5 GM/DL
GLUCOSE SERPL-MCNC: 83 MG/DL (ref 65–99)
GLUCOSE UR STRIP-MCNC: NEGATIVE MG/DL
HCG INTACT+B SERPL-ACNC: <0.5 MIU/ML
HCT VFR BLD AUTO: 40 % (ref 34–46.6)
HGB BLD-MCNC: 13 G/DL (ref 12–15.9)
HGB UR QL STRIP.AUTO: ABNORMAL
HOLD SPECIMEN: NORMAL
HOLD SPECIMEN: NORMAL
HYALINE CASTS UR QL AUTO: ABNORMAL /LPF
IMM GRANULOCYTES # BLD AUTO: 0.04 10*3/MM3 (ref 0–0.05)
IMM GRANULOCYTES NFR BLD AUTO: 0.2 % (ref 0–0.5)
KETONES UR QL STRIP: NEGATIVE
LEUKOCYTE ESTERASE UR QL STRIP.AUTO: ABNORMAL
LIPASE SERPL-CCNC: 15 U/L (ref 13–60)
LYMPHOCYTES # BLD AUTO: 3.32 10*3/MM3 (ref 0.7–3.1)
LYMPHOCYTES NFR BLD AUTO: 20.4 % (ref 19.6–45.3)
MCH RBC QN AUTO: 27.8 PG (ref 26.6–33)
MCHC RBC AUTO-ENTMCNC: 32.5 G/DL (ref 31.5–35.7)
MCV RBC AUTO: 85.5 FL (ref 79–97)
MONOCYTES # BLD AUTO: 1.09 10*3/MM3 (ref 0.1–0.9)
MONOCYTES NFR BLD AUTO: 6.7 % (ref 5–12)
NEUTROPHILS NFR BLD AUTO: 11.26 10*3/MM3 (ref 1.7–7)
NEUTROPHILS NFR BLD AUTO: 69.4 % (ref 42.7–76)
NITRITE UR QL STRIP: POSITIVE
NRBC BLD AUTO-RTO: 0 /100 WBC (ref 0–0.2)
PH UR STRIP.AUTO: 6 [PH] (ref 5–8)
PLATELET # BLD AUTO: 343 10*3/MM3 (ref 140–450)
PMV BLD AUTO: 9.3 FL (ref 6–12)
POTASSIUM SERPL-SCNC: 3.8 MMOL/L (ref 3.5–5.2)
PROT SERPL-MCNC: 7.3 G/DL (ref 6–8.5)
PROT UR QL STRIP: ABNORMAL
RBC # BLD AUTO: 4.68 10*6/MM3 (ref 3.77–5.28)
RBC # UR STRIP: ABNORMAL /HPF
REF LAB TEST METHOD: ABNORMAL
SODIUM SERPL-SCNC: 139 MMOL/L (ref 136–145)
SP GR UR STRIP: 1.01 (ref 1–1.03)
SQUAMOUS #/AREA URNS HPF: ABNORMAL /HPF
UROBILINOGEN UR QL STRIP: ABNORMAL
WBC # UR STRIP: ABNORMAL /HPF
WBC NRBC COR # BLD AUTO: 16.24 10*3/MM3 (ref 3.4–10.8)
WHOLE BLOOD HOLD COAG: NORMAL
WHOLE BLOOD HOLD SPECIMEN: NORMAL

## 2024-05-17 PROCEDURE — 74176 CT ABD & PELVIS W/O CONTRAST: CPT

## 2024-05-17 PROCEDURE — 25010000002 KETOROLAC TROMETHAMINE PER 15 MG: Performed by: EMERGENCY MEDICINE

## 2024-05-17 PROCEDURE — 25010000002 CEFTRIAXONE PER 250 MG: Performed by: EMERGENCY MEDICINE

## 2024-05-17 PROCEDURE — 87077 CULTURE AEROBIC IDENTIFY: CPT | Performed by: EMERGENCY MEDICINE

## 2024-05-17 PROCEDURE — 87086 URINE CULTURE/COLONY COUNT: CPT | Performed by: EMERGENCY MEDICINE

## 2024-05-17 PROCEDURE — 25810000003 LACTATED RINGERS SOLUTION: Performed by: EMERGENCY MEDICINE

## 2024-05-17 PROCEDURE — 99284 EMERGENCY DEPT VISIT MOD MDM: CPT

## 2024-05-17 PROCEDURE — 80053 COMPREHEN METABOLIC PANEL: CPT | Performed by: EMERGENCY MEDICINE

## 2024-05-17 PROCEDURE — 85025 COMPLETE CBC W/AUTO DIFF WBC: CPT | Performed by: EMERGENCY MEDICINE

## 2024-05-17 PROCEDURE — 36415 COLL VENOUS BLD VENIPUNCTURE: CPT

## 2024-05-17 PROCEDURE — 87186 SC STD MICRODIL/AGAR DIL: CPT | Performed by: EMERGENCY MEDICINE

## 2024-05-17 PROCEDURE — 96375 TX/PRO/DX INJ NEW DRUG ADDON: CPT

## 2024-05-17 PROCEDURE — 83690 ASSAY OF LIPASE: CPT | Performed by: EMERGENCY MEDICINE

## 2024-05-17 PROCEDURE — 96365 THER/PROPH/DIAG IV INF INIT: CPT

## 2024-05-17 PROCEDURE — 87040 BLOOD CULTURE FOR BACTERIA: CPT | Performed by: EMERGENCY MEDICINE

## 2024-05-17 PROCEDURE — 84702 CHORIONIC GONADOTROPIN TEST: CPT | Performed by: EMERGENCY MEDICINE

## 2024-05-17 PROCEDURE — 81001 URINALYSIS AUTO W/SCOPE: CPT | Performed by: EMERGENCY MEDICINE

## 2024-05-17 RX ORDER — SODIUM CHLORIDE 0.9 % (FLUSH) 0.9 %
10 SYRINGE (ML) INJECTION AS NEEDED
Status: DISCONTINUED | OUTPATIENT
Start: 2024-05-17 | End: 2024-05-17 | Stop reason: HOSPADM

## 2024-05-17 RX ORDER — CEPHALEXIN 500 MG/1
500 CAPSULE ORAL 4 TIMES DAILY
Qty: 28 CAPSULE | Refills: 0 | Status: SHIPPED | OUTPATIENT
Start: 2024-05-17

## 2024-05-17 RX ORDER — KETOROLAC TROMETHAMINE 30 MG/ML
30 INJECTION, SOLUTION INTRAMUSCULAR; INTRAVENOUS ONCE
Status: COMPLETED | OUTPATIENT
Start: 2024-05-17 | End: 2024-05-17

## 2024-05-17 RX ADMIN — KETOROLAC TROMETHAMINE 30 MG: 30 INJECTION, SOLUTION INTRAMUSCULAR; INTRAVENOUS at 06:43

## 2024-05-17 RX ADMIN — CEFTRIAXONE SODIUM 2000 MG: 2 INJECTION, POWDER, FOR SOLUTION INTRAMUSCULAR; INTRAVENOUS at 07:34

## 2024-05-17 RX ADMIN — SODIUM CHLORIDE, POTASSIUM CHLORIDE, SODIUM LACTATE AND CALCIUM CHLORIDE 1000 ML: 600; 310; 30; 20 INJECTION, SOLUTION INTRAVENOUS at 06:43

## 2024-05-17 NOTE — Clinical Note
[facility] EMERGENCY ROOM  [address] [place] [address]  Phone: [phone]  Fax: [phone]    Angeilca Reynaga was seen and treated in our emergency department on 5/17/2024.  She may return to work on 05/20/2024.         Thank you for choosing [facility].    Odette Wilson RN

## 2024-05-17 NOTE — DISCHARGE INSTRUCTIONS
Rest at home.  Drink plenty fluids.  Ensure adequate hydration.  Take Tylenol and/or ibuprofen for pain.  Take the antibiotics as prescribed.  Start them tomorrow on 5/18/2024.  With your primary care provider in 7 to 10 days if symptoms or not resolving.  Return to the ER for severe abdominal pain/flank pain, fever greater than 101, intractable vomiting, or any other concerns issues that may arise.

## 2024-05-17 NOTE — ED PROVIDER NOTES
Time: 6:35 AM EDT  Date of encounter:  2024  Independent Historian/Clinical History and Information was obtained by:   Patient  Chief Complaint: Concerns for urinary tract infection.    History is limited by: N/A    History of Present Illness:  Patient is a 29 y.o. year old female who presents to the emergency department for evaluation of possible urinary tract infection.  Patient reports symptoms began yesterday.  Patient is that she has been having urinary frequency as well as some hematuria and dysuria.  Patient reports a history of UTIs.  Patient states that this morning she was awoken middle the night with intense right flank pain.  Patient does not think she can wait until the urgent care is open to get evaluated.  She reports she did call her PCPs office yesterday but they had no appointments until Monday.  Patient reports having mild nausea but denies any vomiting or diarrhea.    HPI    Patient Care Team  Primary Care Provider: Shanda Jimenez APRN    Past Medical History:     Allergies   Allergen Reactions    Iodinated Contrast Media Hives     Past Medical History:   Diagnosis Date    Anxiety     Depression     Ovarian cyst      Past Surgical History:   Procedure Laterality Date     SECTION      x2     Family History   Problem Relation Age of Onset    Breast cancer Paternal Grandfather     Ovarian cancer Neg Hx     Uterine cancer Neg Hx     Colon cancer Neg Hx     Melanoma Neg Hx     Prostate cancer Neg Hx     Deep vein thrombosis Neg Hx        Home Medications:  Prior to Admission medications    Medication Sig Start Date End Date Taking? Authorizing Provider   acetaminophen (TYLENOL) 500 MG tablet Take 2 tablets 3 times a day as needed for aches and pains, or temperature. 23   Anna Joshi MD   sertraline (ZOLOFT) 25 MG tablet Take 25 mg by mouth Daily.    Provider, MD Giancarlo        Social History:   Social History     Tobacco Use    Smoking status: Never    Smokeless  "tobacco: Never   Vaping Use    Vaping status: Never Used   Substance Use Topics    Alcohol use: Not Currently    Drug use: Never         Review of Systems:  Review of Systems   Constitutional:  Negative for chills and fever.   HENT:  Negative for congestion, ear pain and sore throat.    Eyes:  Negative for pain.   Respiratory:  Negative for cough, chest tightness and shortness of breath.    Cardiovascular:  Negative for chest pain.   Gastrointestinal:  Negative for abdominal pain, diarrhea, nausea and vomiting.   Genitourinary:  Positive for dysuria, flank pain and frequency. Negative for hematuria.   Musculoskeletal:  Negative for joint swelling.   Skin:  Negative for pallor.   Neurological:  Negative for seizures and headaches.   All other systems reviewed and are negative.       Physical Exam:  BP 98/64   Pulse 76   Temp 98.3 °F (36.8 °C) (Oral)   Resp 17   Ht 149.9 cm (59\")   Wt 67.5 kg (148 lb 13 oz)   LMP 05/02/2024 (Exact Date)   SpO2 98%   BMI 30.06 kg/m²     Physical Exam    Vital signs were reviewed under triage note.  General appearance - Patient appears well-developed and well-nourished.  Patient is in no acute distress.  Head - Normocephalic, atraumatic.  Pupils - Equal, round, reactive to light.  Extraocular muscles are intact.  Conjunctiva is clear.  Nasal - Normal inspection.  No evidence of trauma or epistaxis.  Tympanic membranes - Gray, intact without erythema or retractions.  Oral mucosa - Pink and moist without lesions or erythema.  Uvula is midline.  Chest wall - Atraumatic.  Chest wall is nontender.  There are no vesicular rashes noted.  Neck - Supple.  Trachea was midline.  There is no palpable lymphadenopathy or thyromegaly.  There are no meningeal signs  Lungs - Clear to auscultation and percussion bilaterally.  Heart - Regular rate and rhythm without any murmurs, clicks, or gallops.  Abdomen - Soft.  Bowel sounds are present.  There is no palpable tenderness.  There is no rebound, " guarding, or rigidity.  There are no palpable masses.  There are no pulsatile masses.  Back - Spine is straight and midline.  There is moderate right-sided CVA tenderness.  Extremities - Intact x4 with full range of motion.  There is no palpable edema.  Pulses are intact x4 and equal.  Neurologic - Patient is awake, alert, and oriented x3.  Cranial nerves II through XII are grossly intact.  Motor and sensory functions grossly intact.  Cerebellar function was normal.  Integument - There are no rashes.  There are no petechia or purpura lesions noted.  There are no vesicular lesions noted.          Procedures:  Procedures      Medical Decision Making:      Comorbidities that affect care:    Anxiety, depression, ovarian cyst    External Notes reviewed:    Previous Clinic Note: Office visit with CHEVY Quan on 3/5/2024 was reviewed by me.      The following orders were placed and all results were independently analyzed by me:  Orders Placed This Encounter   Procedures    Blood Culture - Blood,    Blood Culture - Blood,    Urine Culture - Urine,    CT Abdomen Pelvis Without Contrast    [facility] Draw    Comprehensive Metabolic Panel    Lipase    Urinalysis With Microscopic If Indicated (No Culture) - Urine, Clean Catch    hCG, Quantitative, Pregnancy    CBC Auto Differential    Urinalysis, Microscopic Only - Urine, Clean Catch    NPO Diet NPO Type: Strict NPO    Undress & Gown    Insert Peripheral IV    CBC & Differential    Green Top (Gel)    Lavender Top    Gold Top - SST    Light Blue Top       Medications Given in the Emergency Department:  Medications   sodium chloride 0.9 % flush 10 mL (has no administration in time range)   cefTRIAXone (ROCEPHIN) 2,000 mg in sodium chloride 0.9 % 100 mL IVPB-VTB (has no administration in time range)   ketorolac (TORADOL) injection 30 mg (30 mg Intravenous Given 5/17/24 0643)   lactated ringers bolus 1,000 mL (1,000 mL Intravenous New Bag 5/17/24 0643)        ED Course:     The  patient was seen and evaluated in the ED by me.  The above history and physical examination was performed as documented.  Diagnostic data was obtained.  Results reviewed.  Patient does not show any evidence of a kidney stones or nephrolithiasis.  Patient does have an obvious urinary tract infection/cystitis.  Urine culture was ordered.  Patient was given dose of IV Rocephin.  Patient stable for discharge home with outpatient treatment.  I did discuss the findings with the patient answered all questions.    Labs:    Lab Results (last 24 hours)       Procedure Component Value Units Date/Time    CBC & Differential [[id number]]  (Abnormal) Collected: 05/17/24 0513    Specimen: Blood Updated: 05/17/24 0520    Narrative:      The following orders were created for panel order CBC & Differential.  Procedure                               Abnormality         Status                     ---------                               -----------         ------                     CBC Auto Differential[[id number]]        Abnormal            Final result                 Please view results for these tests on the individual orders.    Comprehensive Metabolic Panel [[id number]] Collected: 05/17/24 0513    Specimen: Blood Updated: 05/17/24 0543     Glucose 83 mg/dL      BUN 10 mg/dL      Creatinine 0.66 mg/dL      Sodium 139 mmol/L      Potassium 3.8 mmol/L      Chloride 104 mmol/L      CO2 25.1 mmol/L      Calcium 8.6 mg/dL      Total Protein 7.3 g/dL      Albumin 3.8 g/dL      ALT (SGPT) 31 U/L      AST (SGOT) 14 U/L      Alkaline Phosphatase 62 U/L      Total Bilirubin 0.2 mg/dL      Globulin 3.5 gm/dL      A/G Ratio 1.1 g/dL      BUN/Creatinine Ratio 15.2     Anion Gap 9.9 mmol/L      eGFR 122.7 mL/min/1.73     Narrative:      GFR Normal >60  Chronic Kidney Disease <60  Kidney Failure <15      Lipase [[id number]]  (Normal) Collected: 05/17/24 0513    Specimen: Blood Updated: 05/17/24 0543     Lipase 15 U/L     hCG, Quantitative, Pregnancy  [[id number]] Collected: 05/17/24 0513    Specimen: Blood Updated: 05/17/24 0541     HCG Quantitative <0.50 mIU/mL     Narrative:      HCG Ranges by Gestational Age    Females - non-pregnant premenopausal   </= 1mIU/mL HCG  Females - postmenopausal               </= 7mIU/mL HCG    3 Weeks       5.4   -      72 mIU/mL  4 Weeks      10.2   -     708 mIU/mL  5 Weeks       217   -   8,245 mIU/mL  6 Weeks       152   -  32,177 mIU/mL  7 Weeks     4,059   - 153,767 mIU/mL  8 Weeks    31,366   - 149,094 mIU/mL  9 Weeks    59,109   - 135,901 mIU/mL  10 Weeks   44,186   - 170,409 mIU/mL  12 Weeks   27,107   - 201,615 mIU/mL  14 Weeks   24,302   -  93,646 mIU/mL  15 Weeks   12,540   -  69,747 mIU/mL  16 Weeks    8,904   -  55,332 mIU/mL  17 Weeks    8,240   -  51,793 mIU/mL  18 Weeks    9,649   -  55,271 mIU/mL      CBC Auto Differential [[id number]]  (Abnormal) Collected: 05/17/24 0513    Specimen: Blood Updated: 05/17/24 0520     WBC 16.24 10*3/mm3      RBC 4.68 10*6/mm3      Hemoglobin 13.0 g/dL      Hematocrit 40.0 %      MCV 85.5 fL      MCH 27.8 pg      MCHC 32.5 g/dL      RDW 13.2 %      RDW-SD 41.3 fl      MPV 9.3 fL      Platelets 343 10*3/mm3      Neutrophil % 69.4 %      Lymphocyte % 20.4 %      Monocyte % 6.7 %      Eosinophil % 3.0 %      Basophil % 0.3 %      Immature Grans % 0.2 %      Neutrophils, Absolute 11.26 10*3/mm3      Lymphocytes, Absolute 3.32 10*3/mm3      Monocytes, Absolute 1.09 10*3/mm3      Eosinophils, Absolute 0.48 10*3/mm3      Basophils, Absolute 0.05 10*3/mm3      Immature Grans, Absolute 0.04 10*3/mm3      nRBC 0.0 /100 WBC     Urinalysis With Microscopic If Indicated (No Culture) - Urine, Clean Catch [[id number]]  (Abnormal) Collected: 05/17/24 0545    Specimen: Urine, Clean Catch Updated: 05/17/24 0618     Color, UA Dark Yellow     Appearance, UA Cloudy     pH, UA 6.0     Specific Gravity, UA 1.013     Glucose, UA Negative     Ketones, UA Negative     Bilirubin, UA Negative     Blood, UA  Large (3+)     Protein,  mg/dL (2+)     Leuk Esterase, UA Large (3+)     Nitrite, UA Positive     Urobilinogen, UA 1.0 E.U./dL    Urinalysis, Microscopic Only - Urine, Clean Catch [[id number]]  (Abnormal) Collected: 05/17/24 0545    Specimen: Urine, Clean Catch Updated: 05/17/24 0618     RBC, UA 6-10 /HPF      WBC, UA 21-50 /HPF      Bacteria, UA 2+ /HPF      Squamous Epithelial Cells, UA 3-6 /HPF      Hyaline Casts, UA None Seen /LPF      Methodology Manual Light Microscopy    Urine Culture - Urine, Urine, Clean Catch [[id number]] Collected: 05/17/24 0545    Specimen: Urine, Clean Catch Updated: 05/17/24 0646             Imaging:    CT Abdomen Pelvis Without Contrast    Result Date: 5/17/2024  CT ABDOMEN PELVIS WO CONTRAST-  INDICATION: Right flank pain and hematuria.  TECHNIQUE: CT of the abdomen and pelvis without IV contrast. Coronal and sagittal reconstructions were obtained.  Radiation dose reduction techniques included automated exposure control or exposure modulation based on body size.  COMPARISON: None available.  FINDINGS: Lung bases are clear. Abdominal aorta is normal in caliber. Gallbladder unremarkable. No renal or ureteral stones. No hydronephrosis. The unenhanced solid abdominal organs are normal. There is some thickening of the urinary bladder wall with adjacent fat stranding concerning for cystitis. There appears to be a left ovarian cyst measuring up to about 3.5 cm. There was also a left ovarian cyst measuring up to 4.2 cm on pelvic ultrasound 1/22/2024. Solid pelvic organs are otherwise grossly normal. There is some trace free fluid in the cul-de-sac that is probably physiologic or reactive.  The appendix is normal. Large bowel is normal with no evidence of acute colitis. There are some gas-filled small bowel loops that may reflect an ileus. No small bowel obstruction. Stomach is grossly normal. No bulky adenopathy is seen.       1. Thickening of the urinary bladder with adjacent fat  stranding compatible with cystitis. 2. No renal or ureteral stones. No hydronephrosis. 3. 3.5 cm left ovarian cyst. Patient had a 4.2 cm left ovarian cyst on pelvic ultrasound of 1/22/2024. Trace free fluid in the cul-de-sac may be physiologic or reactive. 4. Mild small bowel ileus without obstruction. The large bowel and appendix appear normal.    Electronically Signed By-Dr. Vaibhav Andersen MD On:5/17/2024 7:04 AM         Differential Diagnosis and Discussion:    Dysuria: Differential diagnosis includes but is not limited to urethritis, cystitis, pyelonephritis, ureteral calculi, neoplasm, chemical irritant, urethral stricture, and trauma    All labs were reviewed and interpreted by me.  CT scan radiology impression was interpreted by me.    MDM     Amount and/or Complexity of Data Reviewed  Clinical lab tests: reviewed  Tests in the radiology section of CPT®: reviewed             Patient Care Considerations:    SEPSIS was considered but is NOT present in the emergency department as SIRS criteria is not present.      Consultants/Shared Management Plan:    None    Social Determinants of Health:    Patient is independent, reliable, and has access to care.       Disposition and Care Coordination:    Discharged: I considered escalation of care by admitting this patient to the hospital, however after completing the workup there is no evidence of sepsis or infected kidney stone.    I have explained the patient´s condition, diagnoses and treatment plan based on the information available to me at this time. I have answered questions and addressed any concerns. The patient has a good  understanding of the patient´s diagnosis, condition, and treatment plan as can be expected at this point. The vital signs have been stable. The patient´s condition is stable and appropriate for discharge from the emergency department.      The patient will pursue further outpatient evaluation with the primary care physician or other  designated or consulting physician as outlined in the discharge instructions. They are agreeable to this plan of care and follow-up instructions have been explained in detail. The patient has received these instructions in written format and has expressed an understanding of the discharge instructions. The patient is aware that any significant change in condition or worsening of symptoms should prompt an immediate return to this or the closest emergency department or call to 911.  I have explained discharge medications and the need for follow up with the patient/caretakers. This was also printed in the discharge instructions. Patient was discharged with the following medications and follow up:      Medication List        New Prescriptions      cephalexin 500 MG capsule  Commonly known as: KEFLEX  Take 1 capsule by mouth 4 (Four) Times a Day.               Where to Get Your Medications        These medications were sent to [id number] - [place], [place] - [address] - [phone]  - [phone]   [address], [place] [address]      Phone: [phone]   cephalexin 500 MG capsule      Shanda Jimenez, APRN  [address]  [place] [address] [phone]    In 1 week  If symptoms fail to resolve or improve      Final diagnoses:   Acute UTI        ED Disposition       ED Disposition   Discharge    Condition   Stable    Comment   --               This medical record created using voice recognition software.             Pipe Zazueta DO  05/17/24 1785

## 2024-05-19 LAB — BACTERIA SPEC AEROBE CULT: ABNORMAL

## 2024-05-20 ENCOUNTER — TELEPHONE (OUTPATIENT)
Dept: INTERNAL MEDICINE | Facility: CLINIC | Age: 29
End: 2024-05-20

## 2024-05-20 NOTE — TELEPHONE ENCOUNTER
Caller: Angelica Reynaga    Relationship to patient: Self    Best call back number: [phone]     Chief complaint: UTI SYMPTOMS    Type of visit: SAME DAY, OFFICE VISIT    Requested date: ANY DAY, AS SOON AS POSSIBLE, BUT AFTER 2:30     If rescheduling, when is the original appointment: 05.20.2024     Additional notes: PATIENT HAD AN APPOINTMENT ON 05.20.2024 BUT UNABLE TO MAKE IT DUE TO WORK SCHEDULE. ATTEMPTED TO WARM TRANSFER BUT WARM TRANSFER UNSUCCESSFUL

## 2024-05-21 ENCOUNTER — TELEPHONE (OUTPATIENT)
Dept: INTERNAL MEDICINE | Facility: CLINIC | Age: 29
End: 2024-05-21
Payer: MEDICAID

## 2024-05-21 NOTE — TELEPHONE ENCOUNTER
Called patient's home and [facility] for return call to office.  Patient seen recently on 5/17/2024 at ED/ER.  Urine cultures and labs completed at that time.  Patient was started on Keflex - 1 PO QID x 7 days.  Patient asking for an appointment after 2:30pm per front office staff.  Patient will need to schedule appointment with provider if symptoms have not improved or gotten worse since ED / ER visit.  Please schedule accordingly.    Kamilah Francois RN BSN  [facility]

## 2024-05-22 LAB
BACTERIA SPEC AEROBE CULT: NORMAL
BACTERIA SPEC AEROBE CULT: NORMAL

## 2024-05-31 ENCOUNTER — OFFICE VISIT (OUTPATIENT)
Dept: INTERNAL MEDICINE | Facility: CLINIC | Age: 29
End: 2024-05-31
Payer: MEDICAID

## 2024-05-31 VITALS
OXYGEN SATURATION: 98 % | BODY MASS INDEX: 30.84 KG/M2 | WEIGHT: 153 LBS | SYSTOLIC BLOOD PRESSURE: 122 MMHG | RESPIRATION RATE: 18 BRPM | HEART RATE: 86 BPM | HEIGHT: 59 IN | DIASTOLIC BLOOD PRESSURE: 70 MMHG | TEMPERATURE: 97.1 F

## 2024-05-31 DIAGNOSIS — F41.1 GAD (GENERALIZED ANXIETY DISORDER): ICD-10-CM

## 2024-05-31 DIAGNOSIS — Z00.00 ANNUAL PHYSICAL EXAM: Primary | ICD-10-CM

## 2024-05-31 DIAGNOSIS — Z11.3 SCREEN FOR STD (SEXUALLY TRANSMITTED DISEASE): ICD-10-CM

## 2024-05-31 DIAGNOSIS — L02.92 RECURRENT BOILS: ICD-10-CM

## 2024-05-31 DIAGNOSIS — F33.0 MAJOR DEPRESSIVE DISORDER, RECURRENT, MILD: ICD-10-CM

## 2024-05-31 PROCEDURE — 2014F MENTAL STATUS ASSESS: CPT | Performed by: NURSE PRACTITIONER

## 2024-05-31 PROCEDURE — 1125F AMNT PAIN NOTED PAIN PRSNT: CPT | Performed by: NURSE PRACTITIONER

## 2024-05-31 PROCEDURE — 36415 COLL VENOUS BLD VENIPUNCTURE: CPT | Performed by: NURSE PRACTITIONER

## 2024-05-31 PROCEDURE — 86695 HERPES SIMPLEX TYPE 1 TEST: CPT | Performed by: NURSE PRACTITIONER

## 2024-05-31 PROCEDURE — 86696 HERPES SIMPLEX TYPE 2 TEST: CPT | Performed by: NURSE PRACTITIONER

## 2024-05-31 PROCEDURE — 99395 PREV VISIT EST AGE 18-39: CPT | Performed by: NURSE PRACTITIONER

## 2024-05-31 RX ORDER — SERTRALINE HYDROCHLORIDE 25 MG/1
25 TABLET, FILM COATED ORAL DAILY
Qty: 90 TABLET | Refills: 0 | Status: SHIPPED | OUTPATIENT
Start: 2024-05-31

## 2024-05-31 RX ORDER — CHLORHEXIDINE GLUCONATE 40 MG/ML
1 SOLUTION TOPICAL WEEKLY
Qty: 236 ML | Refills: 0 | Status: SHIPPED | OUTPATIENT
Start: 2024-05-31

## 2024-05-31 NOTE — PROGRESS NOTES
"Chief Complaint  Annual Exam and Exposure to STD (Rash on vaginal area. Boils. Possible STD. Concerns. Last Pap 10/2023 Normal.)    Subjective          Angelica Reynaga presents to [facility]  History of Present Illness  Annual physical    Recurrent boils, believes this occurs from ingrown hairs. Resolved at present     FLORI/depression-taking zoloft in the last 6 mths  Planning to do counseling  Denies SI/HI    Had STD screening in march. Would like HSV antibody testing  Objective   Vital Signs:   /70 (BP Location: Left arm, Patient Position: Sitting, Cuff Size: Adult)   Pulse 86   Temp 97.1 °F (36.2 °C)   Resp 18   Ht 149.9 cm (59\")   Wt 69.4 kg (153 lb)   SpO2 98%   BMI 30.90 kg/m²     Physical Exam  Vitals and nursing note reviewed.   Constitutional:       General: She is not in acute distress.     Appearance: Normal appearance.   HENT:      Head: Normocephalic and atraumatic.      Right Ear: Tympanic membrane, ear canal and external ear normal.      Left Ear: Tympanic membrane, ear canal and external ear normal.      Nose: Nose normal.      Mouth/Throat:      Mouth: Mucous membranes are moist.   Eyes:      Conjunctiva/sclera: Conjunctivae normal.   Cardiovascular:      Rate and Rhythm: Normal rate and regular rhythm.      Pulses: Normal pulses.      Heart sounds: Normal heart sounds. No murmur heard.     No friction rub. No gallop.   Pulmonary:      Effort: Pulmonary effort is normal. No respiratory distress.      Breath sounds: No wheezing, rhonchi or rales.   Musculoskeletal:      Cervical back: Neck supple.      Right lower leg: No edema.      Left lower leg: No edema.   Skin:     General: Skin is warm and dry.   Neurological:      General: No focal deficit present.      Mental Status: She is alert and oriented to person, place, and time.   Psychiatric:         Mood and Affect: Mood normal.         Behavior: Behavior normal.        Result Review : "          Procedures      Assessment and Plan    Diagnoses and all orders for this visit:    1. Annual physical exam (Primary)    2. Screen for STD (sexually transmitted disease)  -     HSV 1 & 2 - Specific Antibody, IgG    3. Recurrent boils  Comments:  no issues today. will try hibiclens wash prn. discussed low suspcion for HSV lesions as described  Orders:  -     HSV 1 & 2 - Specific Antibody, IgG    4. FLORI (generalized anxiety disorder)  Comments:  improving. she will continue with zoloft and will start counseling as discussed    5. Major depressive disorder, recurrent, mild    Other orders  -     Chlorhexidine Gluconate 4 % solution; Apply 1 Application topically 1 (One) Time Per Week.  Dispense: 236 mL; Refill: 0  -     sertraline (ZOLOFT) 25 MG tablet; Take 1 tablet by mouth Daily.  Dispense: 90 tablet; Refill: 0    Checking HSV antibodies. Discussed implications of testing.     19 to 39: Counseling/Anticipatory Guidance Discussed: nutrition, family planning/contraception, physical activity, healthy weight, alcohol and drugs, sexual behavior and STDs, mental health, and immunizations      Follow Up   No follow-ups on file.  Patient was given instructions and counseling regarding her condition or for health maintenance advice. Please see specific information pulled into the AVS if appropriate.

## 2024-06-02 LAB
HSV1 IGG SER IA-ACNC: 19.4 INDEX (ref 0–0.9)
HSV2 IGG SER IA-ACNC: <0.91 INDEX (ref 0–0.9)

## 2024-08-23 PROCEDURE — 87480 CANDIDA DNA DIR PROBE: CPT

## 2024-08-23 PROCEDURE — 87510 GARDNER VAG DNA DIR PROBE: CPT

## 2024-08-23 PROCEDURE — 87491 CHLMYD TRACH DNA AMP PROBE: CPT

## 2024-08-23 PROCEDURE — 87591 N.GONORRHOEAE DNA AMP PROB: CPT

## 2024-08-23 PROCEDURE — 87660 TRICHOMONAS VAGIN DIR PROBE: CPT

## 2024-09-26 ENCOUNTER — OFFICE VISIT (OUTPATIENT)
Dept: INTERNAL MEDICINE | Facility: CLINIC | Age: 29
End: 2024-09-26
Payer: MEDICAID

## 2024-09-26 VITALS
BODY MASS INDEX: 33.47 KG/M2 | HEIGHT: 59 IN | RESPIRATION RATE: 18 BRPM | SYSTOLIC BLOOD PRESSURE: 116 MMHG | OXYGEN SATURATION: 98 % | WEIGHT: 166 LBS | HEART RATE: 62 BPM | TEMPERATURE: 97.2 F | DIASTOLIC BLOOD PRESSURE: 62 MMHG

## 2024-09-26 DIAGNOSIS — F41.1 GAD (GENERALIZED ANXIETY DISORDER): Primary | ICD-10-CM

## 2024-09-26 DIAGNOSIS — F33.0 MAJOR DEPRESSIVE DISORDER, RECURRENT, MILD: ICD-10-CM

## 2024-09-26 PROCEDURE — 99213 OFFICE O/P EST LOW 20 MIN: CPT | Performed by: NURSE PRACTITIONER

## 2024-09-26 PROCEDURE — 1125F AMNT PAIN NOTED PAIN PRSNT: CPT | Performed by: NURSE PRACTITIONER

## 2024-09-26 RX ORDER — BUSPIRONE HYDROCHLORIDE 5 MG/1
5 TABLET ORAL 3 TIMES DAILY
Qty: 90 TABLET | Refills: 0 | Status: SHIPPED | OUTPATIENT
Start: 2024-09-26

## 2024-09-26 RX ORDER — ESCITALOPRAM OXALATE 5 MG/1
5 TABLET ORAL DAILY
Qty: 30 TABLET | Refills: 1 | Status: SHIPPED | OUTPATIENT
Start: 2024-09-26

## 2024-11-04 ENCOUNTER — OFFICE VISIT (OUTPATIENT)
Dept: BEHAVIORAL HEALTH | Facility: CLINIC | Age: 29
End: 2024-11-04
Payer: MEDICAID

## 2024-11-04 VITALS
HEART RATE: 82 BPM | DIASTOLIC BLOOD PRESSURE: 75 MMHG | HEIGHT: 59 IN | BODY MASS INDEX: 32.46 KG/M2 | SYSTOLIC BLOOD PRESSURE: 123 MMHG | WEIGHT: 161 LBS

## 2024-11-04 DIAGNOSIS — F31.32 BIPOLAR AFFECTIVE DISORDER, CURRENTLY DEPRESSED, MODERATE: Primary | ICD-10-CM

## 2024-11-04 DIAGNOSIS — F41.1 GENERALIZED ANXIETY DISORDER: ICD-10-CM
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
## 2024-11-04 NOTE — PATIENT INSTRUCTIONS
1.  Please return to clinic at your next scheduled visit.  Please contact the clinic ([phone]) at least 24 hours prior in the event you need to cancel.  2.  Do no harm to yourself or others.    3.  Avoid alcohol and drugs.    4.  Take all medications as prescribed.  Please contact the clinic with any concerns. If you are in need of medication refills, please call the clinic at [phone].    5. Should you want to get in touch with your provider, CHEVY Ordonez, please contact the office ([phone]), and staff will be able to page Janina directly.  6. In the event you have personal crisis, contact the following crisis numbers: Suicide Prevention Hotline 1-434.654.3230; [facility] Helpline 2-862-625-PIWX; [facility] Emergency Room [phone]; text HELLO to 044623; or 361.     SPECIFIC RECOMMENDATIONS:     1.      Medications discussed at this encounter:     New Medications Ordered This Visit   Medications    Cariprazine HCl (Vraylar) 1.5 MG capsule capsule     Sig: Take 1 capsule by mouth Daily.     Dispense:  30 capsule     Refill:  1                       2.      Psychotherapy recommendations: We will continue therapy at future visits.     3.     Return to clinic: Return in about 2 months (around 1/4/2025).

## 2024-11-11 ENCOUNTER — OFFICE VISIT (OUTPATIENT)
Dept: INTERNAL MEDICINE | Facility: CLINIC | Age: 29
End: 2024-11-11
Payer: MEDICAID

## 2024-11-11 VITALS
TEMPERATURE: 96.6 F | WEIGHT: 161 LBS | DIASTOLIC BLOOD PRESSURE: 68 MMHG | BODY MASS INDEX: 32.46 KG/M2 | HEART RATE: 71 BPM | RESPIRATION RATE: 18 BRPM | OXYGEN SATURATION: 98 % | HEIGHT: 59 IN | SYSTOLIC BLOOD PRESSURE: 120 MMHG

## 2024-11-11 DIAGNOSIS — Z23 NEED FOR INFLUENZA VACCINATION: Primary | ICD-10-CM

## 2024-11-11 DIAGNOSIS — F33.0 MAJOR DEPRESSIVE DISORDER, RECURRENT, MILD: ICD-10-CM

## 2024-11-11 DIAGNOSIS — G47.9 SLEEP DISTURBANCE: ICD-10-CM

## 2024-11-11 DIAGNOSIS — F41.1 GAD (GENERALIZED ANXIETY DISORDER): ICD-10-CM

## 2024-11-11 PROCEDURE — 90471 IMMUNIZATION ADMIN: CPT | Performed by: NURSE PRACTITIONER

## 2024-11-11 PROCEDURE — 99214 OFFICE O/P EST MOD 30 MIN: CPT | Performed by: NURSE PRACTITIONER

## 2024-11-11 PROCEDURE — 90656 IIV3 VACC NO PRSV 0.5 ML IM: CPT | Performed by: NURSE PRACTITIONER

## 2024-11-11 PROCEDURE — 1125F AMNT PAIN NOTED PAIN PRSNT: CPT | Performed by: NURSE PRACTITIONER

## 2024-11-11 RX ORDER — ESCITALOPRAM OXALATE 5 MG/1
5 TABLET ORAL DAILY
Qty: 90 TABLET | Refills: 0 | Status: SHIPPED | OUTPATIENT
Start: 2024-11-11

## 2024-11-11 NOTE — PROGRESS NOTES
"Chief Complaint  Anxiety (6 week follow up )      Subjective      History of Present Illness  The patient is a 29-year-old female who presents for evaluation of anxiety.    She reports a significant improvement in her anxiety symptoms since starting Lexapro, with BuSpar also providing substantial relief. However, she has been experiencing sleep disturbances, which she attributes to the recent addition of Vraylar to her medication regimen. Despite this, she has noticed a weight loss of 16 pounds since starting Lexapro. She reports appetite is somewhat reduced. She denies SI/HI. She is still eating regularly. She has also made dietary changes, including reducing her caffeine intake.         Objective   Vital Signs:   Vitals:    11/11/24 0904   BP: 120/68   BP Location: Left arm   Patient Position: Sitting   Cuff Size: Adult   Pulse: 71   Resp: 18   Temp: 96.6 °F (35.9 °C)   TempSrc: Temporal   SpO2: 98%   Weight: 73 kg (161 lb)   Height: 149.9 cm (59.02\")     Body mass index is 32.5 kg/m².    Wt Readings from Last 3 Encounters:   11/11/24 73 kg (161 lb)   11/04/24 73 kg (161 lb)   09/26/24 75.3 kg (166 lb)     BP Readings from Last 3 Encounters:   11/11/24 120/68   11/04/24 123/75   09/26/24 116/62       Health Maintenance   Topic Date Due    TDAP/TD VACCINES (2 - Td or Tdap) 08/13/2017    COVID-19 Vaccine (1 - 2024-25 season) Never done    ANNUAL PHYSICAL  05/31/2025    BMI FOLLOWUP  09/26/2025    PAP SMEAR  10/03/2026    HEPATITIS C SCREENING  Completed    INFLUENZA VACCINE  Completed    Pneumococcal Vaccine 0-64  Aged Out    CHLAMYDIA SCREENING  Discontinued       Physical Exam  Vitals and nursing note reviewed.   Constitutional:       General: She is not in acute distress.     Appearance: Normal appearance.   HENT:      Head: Normocephalic and atraumatic.      Right Ear: External ear normal.      Left Ear: External ear normal.      Nose: Nose normal.      Mouth/Throat:      Mouth: Mucous membranes are moist. "   Eyes:      Conjunctiva/sclera: Conjunctivae normal.   Cardiovascular:      Rate and Rhythm: Normal rate and regular rhythm.      Pulses: Normal pulses.      Heart sounds: Normal heart sounds. No murmur heard.     No friction rub. No gallop.   Pulmonary:      Effort: Pulmonary effort is normal. No respiratory distress.      Breath sounds: No wheezing, rhonchi or rales.   Musculoskeletal:      Cervical back: Neck supple.      Right lower leg: No edema.      Left lower leg: No edema.   Skin:     General: Skin is warm and dry.   Neurological:      General: No focal deficit present.      Mental Status: She is alert and oriented to person, place, and time.   Psychiatric:         Mood and Affect: Mood normal.         Behavior: Behavior normal.        Physical Exam        Result Review :  The following data was reviewed by: CHEVY Correa on 11/11/2024:         Results              Procedures            Assessment & Plan  Need for influenza vaccination    Orders:    Fluzone >6mos ([id number])    FLORI (generalized anxiety disorder)           Major depressive disorder, recurrent, mild           Sleep disturbance              Assessment & Plan  1. Anxiety.  Her anxiety is well-managed with Lexapro and BuSpar. She will continue current medications.  A healthy lifestyle, including a balanced diet and regular exercise, was recommended.    2. Sleep Disturbances.  She is experiencing difficulty falling and staying asleep, potentially due to the recent addition of Vraylar. She was advised to continue the medication for a bit longer to determine if it is the cause. Sleep hygiene tips were provided, including avoiding blue light before bedtime, exercising earlier in the day, and maintaining a consistent sleep schedule.She was also advised against using alcohol as a sleep aid and to avoid caffeine late in the afternoon.    3. Medication Management.  A 90-day refill of Lexapro was provided to ensure she does not run out before her  next appointment with Nathalia in December. She will follow up on anxiety, depression and insomnia with Janina Lema. Health Maintenance.  She will receive her influenza vaccine today. She is due for her annual physical in June 2025, as her last physical was on May 31, 2024.      Patient or patient representative verbalized consent for the use of Ambient Listening during the visit with  CHEVY Correa for chart documentation. 11/11/2024  09:56 EST      FOLLOW UP  Return in about 29 weeks (around 6/2/2025).  Patient was given instructions and counseling regarding her condition or for health maintenance advice. Please see specific information pulled into the AVS if appropriate.     CHEVY Correa  11/11/24  09:57 EST    CURRENT & DISCONTINUED MEDICATIONS  Current Outpatient Medications   Medication Instructions    busPIRone (BUSPAR) 5 mg, Oral, 3 Times Daily    Cariprazine HCl (VRAYLAR) 1.5 mg, Oral, Daily    escitalopram (LEXAPRO) 5 mg, Oral, Daily       Medications Discontinued During This Encounter   Medication Reason    escitalopram (Lexapro) 5 MG tablet Reorder

## 2024-12-30 ENCOUNTER — TELEPHONE (OUTPATIENT)
Dept: OBSTETRICS AND GYNECOLOGY | Facility: CLINIC | Age: 29
End: 2024-12-30
Payer: MEDICAID

## 2024-12-30 NOTE — TELEPHONE ENCOUNTER
Received referral f/u from ER. Needs to see provider for heavy bleeding. Left message. Records will be scanned in EPIC.

## 2024-12-31 ENCOUNTER — TELEPHONE (OUTPATIENT)
Dept: OBSTETRICS AND GYNECOLOGY | Facility: CLINIC | Age: 29
End: 2024-12-31
Payer: MEDICAID

## 2024-12-31 DIAGNOSIS — O00.209 OVARIAN ECTOPIC PREGNANCY, UNSPECIFIED LATERALITY, UNSPECIFIED WHETHER INTRAUTERINE PREGNANCY PRESENT: Primary | ICD-10-CM

## 2024-12-31 NOTE — TELEPHONE ENCOUNTER
Crystal called patient again today, 12/31 left another message. Per Dr. Rock she wants her to have a BHCG before the appt on 1/2.   Patient has not returned any calls.

## 2025-01-01 ENCOUNTER — TELEPHONE (OUTPATIENT)
Dept: OBSTETRICS AND GYNECOLOGY | Facility: CLINIC | Age: 30
End: 2025-01-01
Payer: MEDICAID

## 2025-01-01 PROBLEM — O00.90 ECTOPIC PREGNANCY WITHOUT INTRAUTERINE PREGNANCY: Status: ACTIVE | Noted: 2025-01-01

## 2025-01-01 NOTE — TELEPHONE ENCOUNTER
Message received regarding current ectopic treatment.  Records reviewed and discussed w Dr. Rock.  Letter sent to patient to continue scheduled follow up as ordered by [facility].

## 2025-01-27 ENCOUNTER — TELEPHONE (OUTPATIENT)
Dept: OBSTETRICS AND GYNECOLOGY | Facility: CLINIC | Age: 30
End: 2025-01-27
Payer: MEDICAID

## 2025-01-27 NOTE — TELEPHONE ENCOUNTER
"Received a secure chat from : \"[facility] scheduled a follow up for this patient paul López on 2/7 due to heavy bleeding after an ER visit. She saw Laurie in dec. Was treated w Mtx but I can't see that she did any follow up. Would you contact her?\"    Tried to reach the patient to discuss. She did not answer and her voicemail was full.   "

## 2025-02-04 ENCOUNTER — TELEPHONE (OUTPATIENT)
Dept: OBSTETRICS AND GYNECOLOGY | Facility: CLINIC | Age: 30
End: 2025-02-04
Payer: MEDICAID

## 2025-02-04 NOTE — TELEPHONE ENCOUNTER
Caller: Angelica Reynaga    Relationship: Self    Best call back number: [phone]    What is the best time to reach you: ANY    Who are you requesting to speak with (clinical staff, provider,  specific staff member): TEO    Do you know the name of the person who called: TEO    What was the call regarding: THAT PT NEEDS TO R/S WITH M.D - NA AT CLINICAL OR NON-CLINICAL    Is it okay if the provider responds through MyChart: NO

## 2025-02-04 NOTE — TELEPHONE ENCOUNTER
Tried to reach the patient again to discuss. She did not answer and her voicemail was full. meebee message sent to contact the office regarding her scheduled appointment.

## 2025-02-06 ENCOUNTER — HOSPITAL ENCOUNTER (EMERGENCY)
Facility: HOSPITAL | Age: 30
Discharge: HOME | End: 2025-02-06
Attending: EMERGENCY MEDICINE
Payer: MEDICAID

## 2025-02-06 ENCOUNTER — TELEPHONE (OUTPATIENT)
Dept: OBSTETRICS AND GYNECOLOGY | Facility: CLINIC | Age: 30
End: 2025-02-06
Payer: MEDICAID

## 2025-02-06 VITALS
RESPIRATION RATE: 18 BRPM | OXYGEN SATURATION: 100 % | SYSTOLIC BLOOD PRESSURE: 120 MMHG | WEIGHT: 169.09 LBS | BODY MASS INDEX: 34.09 KG/M2 | TEMPERATURE: 97.6 F | DIASTOLIC BLOOD PRESSURE: 61 MMHG | HEIGHT: 59 IN | HEART RATE: 73 BPM

## 2025-02-06 DIAGNOSIS — Z87.59 HISTORY OF ECTOPIC PREGNANCY: Primary | ICD-10-CM

## 2025-02-06 DIAGNOSIS — Z01.89 ENCOUNTER FOR LABORATORY TEST: ICD-10-CM

## 2025-02-06 LAB — HCG INTACT+B SERPL-ACNC: 49.15 MIU/ML

## 2025-02-06 PROCEDURE — 99283 EMERGENCY DEPT VISIT LOW MDM: CPT

## 2025-02-06 PROCEDURE — 36415 COLL VENOUS BLD VENIPUNCTURE: CPT

## 2025-02-06 PROCEDURE — 84702 CHORIONIC GONADOTROPIN TEST: CPT | Performed by: EMERGENCY MEDICINE

## 2025-02-06 NOTE — ED PROVIDER NOTES
Time: 5:07 PM EST  Date of encounter:  2025  Independent Historian/Clinical History and Information was obtained by:   Patient    History is limited by: N/A    Chief Complaint: Labs      History of Present Illness:  Patient is a 29 y.o. year old female who presents to the emergency department for evaluation of labs.  Patient had a ectopic pregnancy in December.  Was prescribed methotrexate and was supposed to have follow-up labs but did not follow-up with OB/GYN.  She is supposed to have an appointment for tomorrow.  Called OB/GYN office and they told her to come to the ED to get her labs done.  Denies vaginal bleeding  PE in 2024.  She states not too long after she had the methotrexate she did have vaginal bleeding in January.    Patient Care Team  Primary Care Provider: Shanda Jimenez APRN    Past Medical History:     Allergies   Allergen Reactions    Iodinated Contrast Media Hives     Past Medical History:   Diagnosis Date    Anxiety     Depression     Ovarian cyst      Past Surgical History:   Procedure Laterality Date     SECTION      x2     Family History   Problem Relation Age of Onset    Breast cancer Paternal Grandfather     Anxiety disorder Mother     Thyroid disease Mother     Depression Father     Stroke Maternal Grandfather     Ovarian cancer Neg Hx     Uterine cancer Neg Hx     Colon cancer Neg Hx     Melanoma Neg Hx     Prostate cancer Neg Hx     Deep vein thrombosis Neg Hx        Home Medications:  Prior to Admission medications    Medication Sig Start Date End Date Taking? Authorizing Provider   busPIRone (BUSPAR) 5 MG tablet Take 1 tablet by mouth 3 (Three) Times a Day. 24   Shanda Jimenez APRN   Cariprazine HCl (Vraylar) 1.5 MG capsule capsule Take 1 capsule by mouth Daily. 24   Janina Kaur APRN   escitalopram (Lexapro) 5 MG tablet Take 1 tablet by mouth Daily. 24   Shanda Jimenez APRN        Social History:   Social History     Tobacco Use    Smoking  "status: Never    Smokeless tobacco: Never   Vaping Use    Vaping status: Never Used   Substance Use Topics    Alcohol use: Not Currently     Comment: occ.    Drug use: Never         Review of Systems:  Review of Systems   Constitutional: Negative.    HENT: Negative.     Eyes: Negative.    Respiratory: Negative.     Cardiovascular: Negative.    Gastrointestinal: Negative.    Endocrine: Negative.    Genitourinary: Negative.  Negative for pelvic pain and vaginal bleeding.   Musculoskeletal: Negative.    Skin: Negative.    Allergic/Immunologic: Negative.    Neurological: Negative.    Hematological: Negative.    Psychiatric/Behavioral: Negative.          Physical Exam:  /61 (BP Location: Right arm, Patient Position: Sitting)   Pulse 73   Temp 97.6 °F (36.4 °C) (Oral)   Resp 18   Ht 149.9 cm (59\")   Wt 76.7 kg (169 lb 1.5 oz)   LMP 11/06/2024 (Approximate)   SpO2 100%   BMI 34.15 kg/m²     Physical Exam  Vitals and nursing note reviewed.   Constitutional:       General: She is not in acute distress.     Appearance: Normal appearance. She is not ill-appearing, toxic-appearing or diaphoretic.   HENT:      Head: Normocephalic and atraumatic.      Nose: Nose normal.      Mouth/Throat:      Mouth: Mucous membranes are moist.   Eyes:      Extraocular Movements: Extraocular movements intact.      Conjunctiva/sclera: Conjunctivae normal.      Pupils: Pupils are equal, round, and reactive to light.   Cardiovascular:      Rate and Rhythm: Normal rate and regular rhythm.      Heart sounds: Normal heart sounds.   Pulmonary:      Effort: Pulmonary effort is normal.      Breath sounds: Normal breath sounds.   Musculoskeletal:         General: Normal range of motion.      Cervical back: Normal range of motion and neck supple.   Skin:     General: Skin is warm and dry.   Neurological:      General: No focal deficit present.      Mental Status: She is alert and oriented to person, place, and time.   Psychiatric:         Mood " and Affect: Mood normal.         Behavior: Behavior normal.                    Medical Decision Making:      Comorbidities that affect care:    None    External Notes reviewed:    Previous Labs: HCG from 12/27/2024 was 8174      The following orders were placed and all results were independently analyzed by me:  Orders Placed This Encounter   Procedures    hCG, Quantitative, Pregnancy       Medications Given in the Emergency Department:  Medications - No data to display     ED Course:    ED Course as of 02/06/25 1824   Thu Feb 06, 2025   1709 --- PROVIDER IN TRIAGE NOTE ---    The patient was evaluated by Kerri aleman in triage. Orders were placed and the patient is currently awaiting disposition.    [AJ]      ED Course User Index  [AJ] Kerri Wallace PA-C       Labs:    Lab Results (last 24 hours)       Procedure Component Value Units Date/Time    hCG, Quantitative, Pregnancy [[id number]] Collected: 02/06/25 1713    Specimen: Blood from Arm, Left Updated: 02/06/25 1752     HCG Quantitative 49.15 mIU/mL     Narrative:      HCG Ranges by Gestational Age    Females - non-pregnant premenopausal   </= 1mIU/mL HCG  Females - postmenopausal               </= 7mIU/mL HCG    3 Weeks       5.4   -      72 mIU/mL  4 Weeks      10.2   -     708 mIU/mL  5 Weeks       217   -   8,245 mIU/mL  6 Weeks       152   -  32,177 mIU/mL  7 Weeks     4,059   - 153,767 mIU/mL  8 Weeks    31,366   - 149,094 mIU/mL  9 Weeks    59,109   - 135,901 mIU/mL  10 Weeks   44,186   - 170,409 mIU/mL  12 Weeks   27,107   - 201,615 mIU/mL  14 Weeks   24,302   -  93,646 mIU/mL  15 Weeks   12,540   -  69,747 mIU/mL  16 Weeks    8,904   -  55,332 mIU/mL  17 Weeks    8,240   -  51,793 mIU/mL  18 Weeks    9,649   -  55,271 mIU/mL               Imaging:    No Radiology Exams Resulted Within Past 24 Hours      Differential Diagnosis and Discussion:    Vaginal Bleeding: Differential diagnosis includes but is not limited to foreign body, tumor,  vaginitis, dysfunctional uterine bleeding, endocrine abnormalities, coagulation disorder, systemic illness, polyps, complications of pregnancy (possible ectopic pregnancy).    PROCEDURES:    Labs were collected in the emergency department and all labs were reviewed and interpreted by me.    No orders to display       Procedures    MDM     Amount and/or Complexity of Data Reviewed  Decide to obtain previous medical records or to obtain history from someone other than the patient: yes                       Patient Care Considerations:          Consultants/Shared Management Plan:    None    Social Determinants of Health:    Patient is independent, reliable, and has access to care.       Disposition and Care Coordination:    Discharged: The patient is suitable and stable for discharge with no need for consideration of admission.    I have explained the patient´s condition, diagnoses and treatment plan based on the information available to me at this time. I have answered questions and addressed any concerns. The patient has a good  understanding of the patient´s diagnosis, condition, and treatment plan as can be expected at this point. The vital signs have been stable. The patient´s condition is stable and appropriate for discharge from the emergency department.      The patient will pursue further outpatient evaluation with the primary care physician or other designated or consulting physician as outlined in the discharge instructions. They are agreeable to this plan of care and follow-up instructions have been explained in detail. The patient has received these instructions in written format and has expressed an understanding of the discharge instructions. The patient is aware that any significant change in condition or worsening of symptoms should prompt an immediate return to this or the closest emergency department or call to 911.    Final diagnoses:   History of ectopic pregnancy   Encounter for laboratory test         ED Disposition       ED Disposition   Discharge    Condition   Stable    Comment   --               This medical record created using voice recognition software.             Kerri Wallace PA-C  02/06/25 0756

## 2025-02-06 NOTE — DISCHARGE INSTRUCTIONS
Your hCG today is 49.  I did not have one from January to compare to.  Please follow-up with your OB/GYN tomorrow as you have scheduled

## 2025-02-06 NOTE — TELEPHONE ENCOUNTER
I contacted patient regarding an appointment scheduled with our office first with [facility] then rescheduled to Dr Rock.  Patient was evaluated at [facility] ED on 12/27/24 and diagnosed w ectopic gestation.  She was treated w MTX and advised of the needed follow up.  Records from that encounter were faxed to our office to advise us of patient's visit and plan.  I was unable to contact the patient at that time so a letter was sent advising Ms. Reynaga to continue the care plan that was established by [facility].  We have been unable to confirm any follow up was done.  After discussion with Ms. Reynaga today, she advised that she did not have any of the follow up labs performed.  She states she stopped bleeding approximately 4 days after treatment w MTX.  She described very light cramping, not consistent.  She states her bleeding was like a regular period and denies any passage of tissue or heavy bleeding.  I advised Ms. Reynaga of my concern that the ectopic could still be present and need urgent treatment.  I recommended to her to go to the ED for evaluation to facilitate prompt labs and scans as needed to determine status of ectopic.  I stressed the importance of follow up and that ruptured ectopic pregnancies can be fatal if not treated appropriately.  Patient is agreeable to go to the ED today for evaluation.  I advised her to follow up on the recommendations of the treating Provider.  Patient voices understanding.

## 2025-02-06 NOTE — Clinical Note
[facility] EMERGENCY ROOM  [address] [address]  Phone: [phone]  Fax: [phone]    Angelica Reynaga was seen and treated in our emergency department on 2/6/2025.  She may return to work on 02/06/2025.         Thank you for choosing [facility].    Kerri Wallace PA-C

## 2025-02-13 ENCOUNTER — TELEPHONE (OUTPATIENT)
Dept: OBSTETRICS AND GYNECOLOGY | Facility: CLINIC | Age: 30
End: 2025-02-13
Payer: MEDICAID

## 2025-02-13 ENCOUNTER — TELEPHONE (OUTPATIENT)
Dept: OBSTETRICS AND GYNECOLOGY | Facility: CLINIC | Age: 30
End: 2025-02-13

## 2025-02-13 NOTE — TELEPHONE ENCOUNTER
Provider:  DO ALLEN     Caller:FRANCIS CRAIN    Phone Number:[phone]     Reason for Call: PATIENT HASN'T HAD A PERIOD SINCE SHE STOPPED BLEEDING FOR HER MISCARRIAGE//SHE WOULD LIKE TO GET LABS TO SEE HOW FAR SHE IS//PLEASE FOLLOW UP

## 2025-02-13 NOTE — TELEPHONE ENCOUNTER
Hub staff attempted to follow warm transfer process and was unsuccessful     Caller: Angelica Reynaga    Relationship to patient: Self    Best call back number: [phone] (home)       Patient is needing: A CALL BACK FROM A NURSE. PT HAD AN ETOPIC PREGNANCY BACK IN NOV/DECEMBER. THAT HAS RESOLVED, BUT JUST HAD ANOTHER POSITIVE PREG TEST. UNSURE OF LMP.

## 2025-02-27 ENCOUNTER — APPOINTMENT (OUTPATIENT)
Dept: ULTRASOUND IMAGING | Facility: HOSPITAL | Age: 30
End: 2025-02-27
Payer: MEDICAID

## 2025-02-27 ENCOUNTER — HOSPITAL ENCOUNTER (EMERGENCY)
Facility: HOSPITAL | Age: 30
Discharge: HOME | End: 2025-02-27
Attending: EMERGENCY MEDICINE
Payer: MEDICAID

## 2025-02-27 VITALS
HEIGHT: 59 IN | BODY MASS INDEX: 34.36 KG/M2 | RESPIRATION RATE: 18 BRPM | HEART RATE: 80 BPM | SYSTOLIC BLOOD PRESSURE: 123 MMHG | OXYGEN SATURATION: 100 % | WEIGHT: 170.42 LBS | DIASTOLIC BLOOD PRESSURE: 58 MMHG | TEMPERATURE: 98.7 F

## 2025-02-27 DIAGNOSIS — O03.9 MISCARRIAGE: Primary | ICD-10-CM

## 2025-02-27 LAB
ABO GROUP BLD: NORMAL
BASOPHILS # BLD AUTO: 0.04 10*3/MM3 (ref 0–0.2)
BASOPHILS NFR BLD AUTO: 0.5 % (ref 0–1.5)
BLD GP AB SCN SERPL QL: NEGATIVE
DEPRECATED RDW RBC AUTO: 41.8 FL (ref 37–54)
EOSINOPHIL # BLD AUTO: 0.47 10*3/MM3 (ref 0–0.4)
EOSINOPHIL NFR BLD AUTO: 5.4 % (ref 0.3–6.2)
ERYTHROCYTE [DISTWIDTH] IN BLOOD BY AUTOMATED COUNT: 13.3 % (ref 12.3–15.4)
HCG INTACT+B SERPL-ACNC: 14.94 MIU/ML
HCT VFR BLD AUTO: 40.2 % (ref 34–46.6)
HGB BLD-MCNC: 12.7 G/DL (ref 12–15.9)
HOLD SPECIMEN: NORMAL
HOLD SPECIMEN: NORMAL
IMM GRANULOCYTES # BLD AUTO: 0.03 10*3/MM3 (ref 0–0.05)
IMM GRANULOCYTES NFR BLD AUTO: 0.3 % (ref 0–0.5)
LYMPHOCYTES # BLD AUTO: 2.2 10*3/MM3 (ref 0.7–3.1)
LYMPHOCYTES NFR BLD AUTO: 25.3 % (ref 19.6–45.3)
MCH RBC QN AUTO: 27.5 PG (ref 26.6–33)
MCHC RBC AUTO-ENTMCNC: 31.6 G/DL (ref 31.5–35.7)
MCV RBC AUTO: 87.2 FL (ref 79–97)
MONOCYTES # BLD AUTO: 0.66 10*3/MM3 (ref 0.1–0.9)
MONOCYTES NFR BLD AUTO: 7.6 % (ref 5–12)
NEUTROPHILS NFR BLD AUTO: 5.31 10*3/MM3 (ref 1.7–7)
NEUTROPHILS NFR BLD AUTO: 60.9 % (ref 42.7–76)
NRBC BLD AUTO-RTO: 0 /100 WBC (ref 0–0.2)
NUMBER OF DOSES: NORMAL
PLATELET # BLD AUTO: 333 10*3/MM3 (ref 140–450)
PMV BLD AUTO: 9.4 FL (ref 6–12)
RBC # BLD AUTO: 4.61 10*6/MM3 (ref 3.77–5.28)
RH BLD: POSITIVE
WBC NRBC COR # BLD AUTO: 8.71 10*3/MM3 (ref 3.4–10.8)
WHOLE BLOOD HOLD COAG: NORMAL
WHOLE BLOOD HOLD SPECIMEN: NORMAL

## 2025-02-27 PROCEDURE — 36415 COLL VENOUS BLD VENIPUNCTURE: CPT

## 2025-02-27 PROCEDURE — 86901 BLOOD TYPING SEROLOGIC RH(D): CPT

## 2025-02-27 PROCEDURE — 76817 TRANSVAGINAL US OBSTETRIC: CPT

## 2025-02-27 PROCEDURE — 85025 COMPLETE CBC W/AUTO DIFF WBC: CPT

## 2025-02-27 PROCEDURE — 86900 BLOOD TYPING SEROLOGIC ABO: CPT

## 2025-02-27 PROCEDURE — 99284 EMERGENCY DEPT VISIT MOD MDM: CPT

## 2025-02-27 PROCEDURE — 84702 CHORIONIC GONADOTROPIN TEST: CPT

## 2025-02-27 PROCEDURE — 86850 RBC ANTIBODY SCREEN: CPT

## 2025-02-27 RX ORDER — SODIUM CHLORIDE 0.9 % (FLUSH) 0.9 %
10 SYRINGE (ML) INJECTION AS NEEDED
Status: DISCONTINUED | OUTPATIENT
Start: 2025-02-27 | End: 2025-02-27 | Stop reason: HOSPADM

## 2025-02-27 NOTE — ED PROVIDER NOTES
Time: 3:05 PM EST  Date of encounter:  2025  Independent Historian/Clinical History and Information was obtained by:   Patient    History is limited by: N/A    Chief Complaint: vaginal bleeding       History of Present Illness:  Patient is a 29 y.o. year old female who presents to the emergency department for evaluation of vaginal bleeding x 3 days.  She has a prior medical history consistent for 2X  sections alongside history of ectopic pregnancy in 2024.  She states she took methotrexate . patient denies fevers or vomiting.  Denies hematuria or hematochezia.    Patient follows Dr. Rock.  Patient states her last known menstrual period was in November.      Patient Care Team  Primary Care Provider: Shanda Jimenez APRN    Past Medical History:     Allergies   Allergen Reactions    Iodinated Contrast Media Hives     Past Medical History:   Diagnosis Date    Anxiety     Depression     Ovarian cyst      Past Surgical History:   Procedure Laterality Date     SECTION      x2     Family History   Problem Relation Age of Onset    Breast cancer Paternal Grandfather     Anxiety disorder Mother     Thyroid disease Mother     Depression Father     Stroke Maternal Grandfather     Ovarian cancer Neg Hx     Uterine cancer Neg Hx     Colon cancer Neg Hx     Melanoma Neg Hx     Prostate cancer Neg Hx     Deep vein thrombosis Neg Hx        Home Medications:  Prior to Admission medications    Medication Sig Start Date End Date Taking? Authorizing Provider   busPIRone (BUSPAR) 5 MG tablet Take 1 tablet by mouth 3 (Three) Times a Day. 24   Shanda Jimenez APRN   Cariprazine HCl (Vraylar) 1.5 MG capsule capsule Take 1 capsule by mouth Daily. 24   Janina Kaur APRN   escitalopram (Lexapro) 5 MG tablet Take 1 tablet by mouth Daily. 24   Shanda Jimenez APRN        Social History:   Social History     Tobacco Use    Smoking status: Never    Smokeless tobacco: Never   Vaping Use     "Vaping status: Never Used   Substance Use Topics    Alcohol use: Not Currently     Comment: occ.    Drug use: Never         Review of Systems:  Review of Systems   Constitutional:  Negative for chills and fever.   HENT:  Negative for congestion, rhinorrhea and sore throat.    Eyes:  Negative for pain and visual disturbance.   Respiratory:  Negative for apnea, cough, chest tightness and shortness of breath.    Cardiovascular:  Negative for chest pain and palpitations.   Gastrointestinal:  Negative for abdominal pain, diarrhea, nausea and vomiting.   Genitourinary:  Positive for vaginal bleeding. Negative for difficulty urinating and dysuria.   Musculoskeletal:  Negative for joint swelling and myalgias.   Skin:  Negative for color change.   Neurological:  Negative for seizures and headaches.   Psychiatric/Behavioral: Negative.     All other systems reviewed and are negative.       Physical Exam:  /58   Pulse 80   Temp 98.7 °F (37.1 °C)   Resp 18   Ht 149.9 cm (59\")   Wt 77.3 kg (170 lb 6.7 oz)   LMP 11/06/2024 (Approximate) Comment: Positive Home Test  SpO2 100%   BMI 34.42 kg/m²     Physical Exam  Vitals and nursing note reviewed.   Constitutional:       General: She is not in acute distress.     Appearance: Normal appearance. She is not toxic-appearing.   HENT:      Head: Normocephalic and atraumatic.      Jaw: There is normal jaw occlusion.      Mouth/Throat:      Mouth: Mucous membranes are moist.   Eyes:      General: Lids are normal.      Extraocular Movements: Extraocular movements intact.      Conjunctiva/sclera: Conjunctivae normal.      Pupils: Pupils are equal, round, and reactive to light.   Cardiovascular:      Rate and Rhythm: Normal rate and regular rhythm.      Pulses: Normal pulses.      Heart sounds: Normal heart sounds.   Pulmonary:      Effort: Pulmonary effort is normal. No respiratory distress.      Breath sounds: Normal breath sounds. No wheezing or rhonchi.   Abdominal:      " General: Abdomen is flat. There is no distension.      Palpations: Abdomen is soft.      Tenderness: There is no abdominal tenderness. There is no guarding or rebound.   Musculoskeletal:         General: Normal range of motion.      Cervical back: Normal range of motion and neck supple.      Right lower leg: No edema.      Left lower leg: No edema.   Skin:     General: Skin is warm and dry.   Neurological:      General: No focal deficit present.      Mental Status: She is alert and oriented to person, place, and time. Mental status is at baseline.   Psychiatric:         Mood and Affect: Mood normal.         Behavior: Behavior normal.                    Medical Decision Making:      Comorbidities that affect care:    None    External Notes reviewed:          The following orders were placed and all results were independently analyzed by me:  Orders Placed This Encounter   Procedures    US Ob Transvaginal    [place] Draw    hCG, Quantitative, Pregnancy    CBC Auto Differential    NPO Diet NPO Type: Strict NPO    Undress & Gown    Vital Signs    Supplies To Bedside - Notify MD When Ready- Pelvic Cart / Set Up    IP Consult to OB GYN     RhIg Evaluation    Doses of Rh Immune Globulin    Insert Peripheral IV    CBC & Differential    Green Top (Gel)    Lavender Top    Gold Top - SST    Light Blue Top       Medications Given in the Emergency Department:  Medications   sodium chloride 0.9 % flush 10 mL (has no administration in time range)        ED Course:    ED Course as of 25 1908   Thu 2025   1508 --- PROVIDER IN TRIAGE NOTE ---    The patient was evaluated by meCharles in triage. Orders were placed and the patient is currently awaiting disposition.  [CB]      ED Course User Index  [CB] Charles Aburto, PANAMAN       Labs:    Lab Results (last 24 hours)       Procedure Component Value Units Date/Time    CBC & Differential [[id number]]  (Abnormal) Collected: 25 1431    Specimen:  Blood Updated: 02/27/25 1451    Narrative:      The following orders were created for panel order CBC & Differential.  Procedure                               Abnormality         Status                     ---------                               -----------         ------                     CBC Auto Differential[[id number]]        Abnormal            Final result                 Please view results for these tests on the individual orders.    hCG, Quantitative, Pregnancy [[id number]] Collected: 02/27/25 1431    Specimen: Blood Updated: 02/27/25 1507     HCG Quantitative 14.94 mIU/mL     Narrative:      HCG Ranges by Gestational Age    Females - non-pregnant premenopausal   </= 1mIU/mL HCG  Females - postmenopausal               </= 7mIU/mL HCG    3 Weeks       5.4   -      72 mIU/mL  4 Weeks      10.2   -     708 mIU/mL  5 Weeks       217   -   8,245 mIU/mL  6 Weeks       152   -  32,177 mIU/mL  7 Weeks     4,059   - 153,767 mIU/mL  8 Weeks    31,366   - 149,094 mIU/mL  9 Weeks    59,109   - 135,901 mIU/mL  10 Weeks   44,186   - 170,409 mIU/mL  12 Weeks   27,107   - 201,615 mIU/mL  14 Weeks   24,302   -  93,646 mIU/mL  15 Weeks   12,540   -  69,747 mIU/mL  16 Weeks    8,904   -  55,332 mIU/mL  17 Weeks    8,240   -  51,793 mIU/mL  18 Weeks    9,649   -  55,271 mIU/mL      CBC Auto Differential [[id number]]  (Abnormal) Collected: 02/27/25 1431    Specimen: Blood Updated: 02/27/25 1451     WBC 8.71 10*3/mm3      RBC 4.61 10*6/mm3      Hemoglobin 12.7 g/dL      Hematocrit 40.2 %      MCV 87.2 fL      MCH 27.5 pg      MCHC 31.6 g/dL      RDW 13.3 %      RDW-SD 41.8 fl      MPV 9.4 fL      Platelets 333 10*3/mm3      Neutrophil % 60.9 %      Lymphocyte % 25.3 %      Monocyte % 7.6 %      Eosinophil % 5.4 %      Basophil % 0.5 %      Immature Grans % 0.3 %      Neutrophils, Absolute 5.31 10*3/mm3      Lymphocytes, Absolute 2.20 10*3/mm3      Monocytes, Absolute 0.66 10*3/mm3      Eosinophils, Absolute 0.47 10*3/mm3       Basophils, Absolute 0.04 10*3/mm3      Immature Grans, Absolute 0.03 10*3/mm3      nRBC 0.0 /100 WBC              Imaging:    US Ob Transvaginal    Result Date: 2025  US OB TRANSVAGINAL Date of Exam: 2025 3:41 PM EST Indication: vaginal bleeding, technologist note states that the patient received methotrexate after being diagnosed with ectopic pregnancy in 2024, LMP was on 2024, and she tested positive for pregnancy on 2025, hCG 14.94 today, 49.15 on 2025 Comparison: Outside 2024 ultrasound report from [facility] described a right ectopic pregnancy/gestational sac with fetal pole in the right adnexal region of 6 weeks, 2 days. Pelvic ultrasound 2024. Technique: Transvaginal ultrasound was performed to evaluate pregnancy.  Real time scanning was performed with multiple image documentation per protocol.  Findings: Uterus measures 8.5 x 3.5 x 4.6 cm (71.2 mL). Endometrial thickness measures 4 mm. There is a  section scar defect. Cervical nabothian cyst is noted. No intrauterine pregnancy is visualized. Right ovary measures 3.4 x 2.0 x 2.2 cm (7.8 mL). Extraovarian right adnexal thin-walled cystic lesion with peripheral nodularity measures 2.2 x 1.6 x 2.0 cm. No yolk sac or embryo is visualized. Left ovary measures 3.3 x 1.8 x 2.3 cm (6.9 mL) and appears within normal limits. No significant free pelvic fluid is noted.     Impression: 1.No intrauterine pregnancy is visualized. 2.2.2 cm thin-walled cystic extraovarian right adnexal mass with peripheral nodularity does not contain a yolk sac or embryo, but is indeterminate for ectopic pregnancy given absence of intrauterine pregnancy. This could possibly represent sequela of previous ectopic pregnancy that was treated with methotrexate in 2024.  I called this result to Charles AGUERO at 5:00 p.m. on 2025. 3.Recommend obstetric evaluation, correlation with clinical findings, close clinical  follow-up, and follow-up ultrasound as clinically indicated. Electronically Signed: Siri Allison  2/27/2025 5:02 PM EST  Workstation ID: [id number]       Differential Diagnosis and Discussion:    Vaginal Bleeding: Differential diagnosis includes but is not limited to foreign body, tumor, vaginitis, dysfunctional uterine bleeding, endocrine abnormalities, coagulation disorder, systemic illness, polyps, complications of pregnancy (possible ectopic pregnancy).    PROCEDURES:    Labs were collected in the emergency department and all labs were reviewed and interpreted by me.  Ultrasound was performed in the emergency department and the ultrasound impression was interpreted by me.     No orders to display       Procedures    MDM     Amount and/or Complexity of Data Reviewed  Clinical lab tests: reviewed  Tests in the radiology section of CPT®: reviewed         The patient´s CBC that was reviewed and interpreted by me shows no abnormalities of critical concern. Of note, there is no anemia requiring a blood transfusion and the platelet count is acceptable.  hCG 14.94.  Ultrasound OB transvaginal shows No intrauterine pregnancy is visualized.  2.2.2 cm thin-walled cystic extraovarian right adnexal mass with peripheral nodularity does not contain a yolk sac or embryo, but is indeterminate for ectopic pregnancy given absence of intrauterine pregnancy. This could possibly represent sequela of   previous ectopic pregnancy that was treated with methotrexate in December 2024.  I spoke with OB/GYN Dr. Daly who states to have patient follow-up in 48 hours with her PCP for a serial hCG.  I relayed this information to the patient and instructed patient to return to the ED in the meantime if she develops any new or worsening symptoms.  Patient states she understands and agrees with plan of care.              Patient Care Considerations:          Consultants/Shared Management Plan:     I spoke with OB/GYN Dr. Daly who states to  have patient follow-up in 48 hours with her PCP for a serial hCG    Social Determinants of Health:          Disposition and Care Coordination:    Discharged: The patient is suitable and stable for discharge with no need for consideration of admission.    I have explained the patient´s condition, diagnoses and treatment plan based on the information available to me at this time. I have answered questions and addressed any concerns. The patient has a good  understanding of the patient´s diagnosis, condition, and treatment plan as can be expected at this point. The vital signs have been stable. The patient´s condition is stable and appropriate for discharge from the emergency department.      The patient will pursue further outpatient evaluation with the primary care physician or other designated or consulting physician as outlined in the discharge instructions. They are agreeable to this plan of care and follow-up instructions have been explained in detail. The patient has received these instructions in written format and has expressed an understanding of the discharge instructions. The patient is aware that any significant change in condition or worsening of symptoms should prompt an immediate return to this or the closest emergency department or call to 911.  I have explained discharge medications and the need for follow up with the patient/caretakers. This was also printed in the discharge instructions. Patient was discharged with the following medications and follow up:      Medication List      No changes were made to your prescriptions during this visit.      Shanda Jimenez, APRN  [address] [address]  [phone]    Go in 2 days  Serial hCG       Final diagnoses:   Miscarriage        ED Disposition       ED Disposition   Discharge    Condition   Stable    Comment   --               This medical record created using voice recognition software.             Alexis Harris PA-C  02/27/25  1908

## 2025-03-10 ENCOUNTER — OFFICE VISIT (OUTPATIENT)
Dept: BEHAVIORAL HEALTH | Facility: CLINIC | Age: 30
End: 2025-03-10
Payer: MEDICAID

## 2025-03-10 VITALS
SYSTOLIC BLOOD PRESSURE: 123 MMHG | BODY MASS INDEX: 34.47 KG/M2 | DIASTOLIC BLOOD PRESSURE: 79 MMHG | HEART RATE: 72 BPM | HEIGHT: 59 IN | WEIGHT: 171 LBS

## 2025-03-10 DIAGNOSIS — F41.1 GENERALIZED ANXIETY DISORDER: ICD-10-CM

## 2025-03-10 DIAGNOSIS — F31.32 BIPOLAR AFFECTIVE DISORDER, CURRENTLY DEPRESSED, MODERATE: Primary | ICD-10-CM

## 2025-03-10 RX ORDER — ESCITALOPRAM OXALATE 5 MG/1
5 TABLET ORAL DAILY
Qty: 90 TABLET | Refills: 0 | Status: SHIPPED | OUTPATIENT
Start: 2025-03-10

## 2025-03-10 RX ORDER — BUSPIRONE HYDROCHLORIDE 5 MG/1
5 TABLET ORAL 3 TIMES DAILY
Qty: 90 TABLET | Refills: 0 | Status: SHIPPED | OUTPATIENT
Start: 2025-03-10

## 2025-03-10 RX ORDER — ARIPIPRAZOLE 2 MG/1
2 TABLET ORAL DAILY
Qty: 30 TABLET | Refills: 1 | Status: SHIPPED | OUTPATIENT
Start: 2025-03-10

## 2025-03-10 RX ORDER — ONDANSETRON 4 MG/1
4 TABLET, FILM COATED ORAL
COMMUNITY
Start: 2024-12-28

## 2025-03-10 NOTE — PROGRESS NOTES
"[facility]  Medication Management Follow-up      Record Review is below for 03/10/2025 :   5/17/2024WBC 16.24, CBC and CMP are otherwise reassuring  EKG Results:  None in record  Head Imaging:  None in record  Vital Signs:   /79   Pulse 72   Ht 149.9 cm (59.02\")   Wt 77.6 kg (171 lb)   BMI 34.52 kg/m²     Chief Complaint: Bipolar depression. Anxiety.     History of Present Illness:   Angelica Reynaga is a 29 y.o. female who presents today for follow-up and medication management for:    ICD-10-CM ICD-9-CM   1. Bipolar affective disorder, currently depressed, moderate  F31.32 296.52   2. Generalized anxiety disorder  F41.1 300.02       03/10/2025 Patient was experiencing akathisia while taking the vraylar.   Bipolar Depression and Anxiety  \"Things have been pretty difficult. Struggling to get up and get children to school. \"I have been feeling like I am failing at everything\" Experienced two miscarriages since our last encounter. Progression of symptoms, frequency, and intensity is waxing and waning but worse overall. Patient continues to experience feelings of sadness, low mood, lost of interest in usual activities, anhedonia, psychomotor agitation, excessive anxiety and worry, anxiety, difficulty controlling the worry, restlessness, feeling keyed up or on edge, irritability, muscle tension, decreased motivation PHQ-9 is 17and FLORI-7 is 13. and these symptoms are causing significant distress or impairment. Patient denies feeling worthless, guilty, hopelessness,. Denies thinking about death and dying, suicidal ideation, planning, or intent to self-harm.  Denies AVH.  Clinically significant distress or impairment in social, occupational, or other important areas of functioning is waxing and waning but worse overall.    11/04/2024   BIPOLAR   Reports previously experiencing postpartum depression approximately 6 years ago. She was prescribed medication but did not ever take medication as " "prescribed. Describes manic episodes as \"zooming.\" Patient reports that their mood and/or energy levels shift drastically, very low, and at other times very high. During their ''low'' phases, feels a lack of energy; a need to stay in bed or get extra sleep; and little or no motivation to do things they need to do, weight gain, depressed mood, hopeless, ability to function at work or socially is impaired. This is countered with a marked shift or ''switch'' in the way they feel. Energy increases above what is normal for them, and they often get many things done they would not ordinarily be able to do, hyper, irritable, \"on edge,\" aggressive, taking on too many activities, indiscretion, spending excessive amounts of money, impulsive behaviors. Decreased need for sleep. Reports being more talkative, outgoing, or sexual during these periods. Their behavior during these high periods seems strange or annoying to others. Symptoms are severe enough to cause marked impairment in social or occupational functioning. The disturbance in mood and the change in functioning are observable.  Depression  Patient endorses gradually worsening feelings of sadness, low mood, and loss of interest in usual activities. These feelings are accompanied by anhedonia, change in appetite, losing or gaining weight, sleeping too much or not sleeping well (insomnia), fatigue and low energy most days, feeling worthless, guilty, and hopeless. Describes an inability to focus and concentrate that may interfere with daily tasks at home, work, or school. Movements that are unusually slow or agitated (a change which is often noticeable to others). Denies thinking about death and dying, suicidal ideation, planning, or intent to self-harm. These symptoms cause the patient clinically significant distress or impairment in social, occupational, or other important areas of functioning.  PHQ-9 is 14.  THE BIPOLAR SPECTRUM DIAGNOSTIC SCALE (BSDS)   Please read " through the entire passage below before filling in any blanks.   Some individuals notice that their mood and/or energy levels shift drastically   from time to time___Fairly___.   These individuals notice that, at times, their mood and/or energy level is very low,   and at other times, very high_____Yes_.   During their ''low'' phases, these individuals often feel a lack of energy; a need to stay   in bed or get extra sleep; and little or no motivation to do things they need to do____Yes__.   They often put on weight during these periods___Yes___.   During their low phases, these individuals often feel ''blue'', sad all the time, or depressed___Yes___.   Sometimes, during these low phases, they feel hopeless or even suicidal__Denies____.   Their ability to function at work or socially is impaired__Denies____.   Typically, these low phases last for a few weeks, but sometimes they last only a few days___Fairly___.   Individuals with this type of pattern may experience a period of ''normal'' mood in   between mood swings, during which their mood and energy level feels ''right'' and their   ability to function is not disturbed__Yes____.   They may then notice a marked shift or ''switch'' in the way they feel__Yes____.   Their energy increases above what is normal for them, and they often get many things done   they would not ordinarily be able to do___Yes___.   Sometimes, during these ''high'' periods, these individuals feel as if they have too   much energy or feel ''hyper''__Yes____.   Some individuals, during these high periods, may feel irritable, ''on edge'', or aggressive__Yes____.   Some individuals, during these high periods, take on too many activities at once___Fairly___.   During these high periods, some individuals may spend money in ways that cause   them trouble__Yes____.   They may be more talkative, outgoing, or sexual during these periods__Yes____.   Sometimes, their behavior during these high periods  seems strange or annoying to others___Fairly___.   Sometimes, these individuals get into difficulty with co-workers or the police,   during these high periods__Fairly____.   Sometimes, they increase their alcohol or non-prescription drug use during these high periods____Fairly__.   Generalized Anxiety Disorder (FLORI)   Patient experiences excessive anxiety and worry (apprehensive expectation), occurring more days than not for at least 6 months, about a number of events or activities (such as work or school performance). Finds it difficult to control the worry. The anxiety and worry are associated with restlessness or feeling keyed up or on edge, being easily fatigued, difficulty concentrating or mind going blank, irritability, muscle tension, and sleep disturbance (difficulty falling or staying asleep, or restless, unsatisfying sleep). The anxiety, worry, or physical symptoms cause clinically significant distress or impairment in social, occupational, or other important areas of functioning.   FLORI-7 is 13.  Continue Lexapro 5 mg daily  Continue BuSpar 5 mg 3 times daily  Start Vraylar 1.5 mg daily   Follow-up 1 month    Per Referring Provider 9/26/2024 She reports that her anxiety levels have increased since she resumed taking Zoloft a few weeks ago. Despite being consistent with the medication, she has not noticed any improvement in her symptoms. She denies experiencing suicidal thoughts or depression, but acknowledges that her anxiety is the primary concern. She also mentions that the father of her children suggested she might have bipolar disorder due to her high-risk sexual behaviors and spending issues, which she attributes to manic episodes associated with her depression.  FAMILY HISTORY  Her father was diagnosed with depression. Her mother was diagnosed with anxiety.  No Known family history of bipolar    Past Psychiatric History:  Began Treatment: Less than 6 months  Diagnoses: Depression, anxiety,  PPD  Psychiatrist: Denies  Therapist: Denies  Admission History: Denies  Medication Trials: zoloft, lexapro, buspar, vraylar  Family history suicide attempts: Denies  Family history suicide completions: Denies  Suicide Attempts: Denies  Self Harm: Hx of cutting, adolescence  Substance use/abuse:Occasional alcohol  Withdrawal Symptoms: Not applicable  Longest Period Sober: Not applicable  AA: Not applicable  Trauma/Childhood/ACE: **  Access to Firearms: **    Safety/Risk Assessment: Risk of self-harm acutely and chronically is moderate to severe.    Static/Dynamic risk factors include diagnosis of mental disorder, psychosocial stressors,Recent stressor or loss and Social factors.      MENTAL STATUS EXAM   General Appearance:  Cleanly groomed and dressed and well developed  Eye Contact:  Good eye contact  Attitude:  Cooperative and polite  Motor Activity:  Normal gait, posture  Speech:  Normal rate, tone, volume  Mood and affect:  Normal, pleasant and euthymic  Hopelessness:  Denies  Thought Process:  Logical and goal-directed  Associations/ Thought Content:  No delusions  Hallucinations:  None  Suicidal Ideations:  Not present  Homicidal Ideation:  Not present  Sensorium:  Alert  Orientation:  Person, place, time and situation  Immediate Recall, Recent, and Remote Memory:  Intact  Attention Span/ Concentration:  Good  Fund of Knowledge:  Appropriate for age and educational level  Intellectual Functioning:  Average range  Insight:  Good  Judgement:  Good  Reliability:  Good  Impulse Control:  Good     PHQ-9 Depression Screening  PHQ-9 Total Score: 17    Little interest or pleasure in doing things? Over half   Feeling down, depressed, or hopeless? Over half   PHQ-2 Total Score 4   Trouble falling or staying asleep, or sleeping too much? Almost all   Feeling tired or having little energy? Almost all   Poor appetite or overeating? Almost all   Feeling bad about yourself - or that you are a failure or have let yourself or  your family down? Over half   Trouble concentrating on things, such as reading the newspaper or watching television? Over half   Moving or speaking so slowly that other people could have noticed? Or the opposite - being so fidgety or restless that you have been moving around a lot more than usual? Not at all   Thoughts that you would be better off dead, or of hurting yourself in some way? Not at all   PHQ-9 Total Score 17   If you checked off any problems, how difficult have these problems made it for you to do your work, take care of things at home, or get along with other people? Very difficult       FLORI-7  Feeling nervous, anxious or on edge: Several days  Not being able to stop or control worrying: More than half the days  Worrying too much about different things: More than half the days  Trouble Relaxing: Several days  Being so restless that it is hard to sit still: More than half the days  Feeling afraid as if something awful might happen: More than half the days  Becoming easily annoyed or irritable: Nearly every day  FLORI 7 Total Score: 13  If you checked any problems, how difficult have these problems made it for you to do your work, take care of things at home, or get along with other people: Very difficult    Review of systems is negative except as noted in HPI.  Labs:  WBC   Date Value Ref Range Status   02/27/2025 8.71 3.40 - 10.80 10*3/mm3 Final     Platelets   Date Value Ref Range Status   02/27/2025 333 140 - 450 10*3/mm3 Final     Hemoglobin   Date Value Ref Range Status   02/27/2025 12.7 12.0 - 15.9 g/dL Final     Hematocrit   Date Value Ref Range Status   02/27/2025 40.2 34.0 - 46.6 % Final     Glucose   Date Value Ref Range Status   05/17/2024 83 65 - 99 mg/dL Final     Creatinine   Date Value Ref Range Status   05/17/2024 0.66 0.57 - 1.00 mg/dL Final     ALT (SGPT)   Date Value Ref Range Status   05/17/2024 31 1 - 33 U/L Final     AST (SGOT)   Date Value Ref Range Status   05/17/2024 14 1 - 32  U/L Final     BUN   Date Value Ref Range Status   05/17/2024 10 6 - 20 mg/dL Final     eGFR   Date Value Ref Range Status   05/17/2024 122.7 >60.0 mL/min/1.73 Final     Total Cholesterol   Date Value Ref Range Status   12/01/2022 184 0 - 200 mg/dL Final     Triglycerides   Date Value Ref Range Status   12/01/2022 113 0 - 150 mg/dL Final     HDL Cholesterol   Date Value Ref Range Status   12/01/2022 48 40 - 60 mg/dL Final     LDL Cholesterol    Date Value Ref Range Status   12/01/2022 116 (H) 0 - 100 mg/dL Final     VLDL Cholesterol   Date Value Ref Range Status   12/01/2022 20 5 - 40 mg/dL Final     LDL/HDL Ratio   Date Value Ref Range Status   12/01/2022 2.36  Final     TSH   Date Value Ref Range Status   12/01/2022 3.200 0.270 - 4.200 uIU/mL Final      Pain Management Panel           No data to display               Imaging Results:  US Ob Transvaginal  Result Date: 2/27/2025  Impression: 1.No intrauterine pregnancy is visualized. 2.2.2 cm thin-walled cystic extraovarian right adnexal mass with peripheral nodularity does not contain a yolk sac or embryo, but is indeterminate for ectopic pregnancy given absence of intrauterine pregnancy. This could possibly represent sequela of previous ectopic pregnancy that was treated with methotrexate in December 2024.  I called this result to Charles AGUERO at 5:00 p.m. on 2/27/2025. 3.Recommend obstetric evaluation, correlation with clinical findings, close clinical follow-up, and follow-up ultrasound as clinically indicated. Electronically Signed: Siri Allison  2/27/2025 5:02 PM EST  Workstation ID: [id number]    Current Medications:   Current Outpatient Medications   Medication Sig Dispense Refill    busPIRone (BUSPAR) 5 MG tablet Take 1 tablet by mouth 3 (Three) Times a Day. 90 tablet 0    escitalopram (Lexapro) 5 MG tablet Take 1 tablet by mouth Daily. 90 tablet 0    ondansetron (ZOFRAN) 4 MG tablet Take 1 tablet by mouth.      ARIPiprazole (Abilify) 2 MG tablet Take 1  tablet by mouth Daily. 30 tablet 1     No current facility-administered medications for this visit.     Problem List:  Patient Active Problem List   Diagnosis    Anxiety    Family history of cleft lip    Hemorrhoids    Ovarian cyst    Ectopic pregnancy without intrauterine pregnancy     Allergy:   Allergies   Allergen Reactions    Iodinated Contrast Media Hives      Discontinued Medications:  Medications Discontinued During This Encounter   Medication Reason    Cariprazine HCl (Vraylar) 1.5 MG capsule capsule Side effects    busPIRone (BUSPAR) 5 MG tablet Reorder    escitalopram (Lexapro) 5 MG tablet Reorder       PLAN:   Presentation seems most consistent with DSM-V criteria for:  Diagnoses and all orders for this visit:    1. Bipolar affective disorder, currently depressed, moderate (Primary)  -     ARIPiprazole (Abilify) 2 MG tablet; Take 1 tablet by mouth Daily.  Dispense: 30 tablet; Refill: 1  -     escitalopram (Lexapro) 5 MG tablet; Take 1 tablet by mouth Daily.  Dispense: 90 tablet; Refill: 0    2. Generalized anxiety disorder  -     ARIPiprazole (Abilify) 2 MG tablet; Take 1 tablet by mouth Daily.  Dispense: 30 tablet; Refill: 1  -     busPIRone (BUSPAR) 5 MG tablet; Take 1 tablet by mouth 3 (Three) Times a Day.  Dispense: 90 tablet; Refill: 0  -     escitalopram (Lexapro) 5 MG tablet; Take 1 tablet by mouth Daily.  Dispense: 90 tablet; Refill: 0       Continue Lexapro 5 mg daily  Continue BuSpar 5 mg 3 times daily  Discontinue Vraylar   Start abilify 2 mg daily  Follow-up 1 month  Medication Education:   LEXAPRO (ESCITALOPRAM)  Risks, benefits, alternatives discussed with patient including GI upset, nausea vomiting diarrhea, theoretical decrease of seizure threshold predisposing the patient to a slightly higher seizure risk, headaches, sexual dysfunction, serotonin syndrome, bleeding risk.  After discussion of these risks and benefits, the patient voiced understanding and agreed to proceed.  BUSPAR  (BUSPIRONE) Risks, benefits, alternatives discussed with patient including nausea, GI upset, mild sedation, falls risk.  After discussion of these risks and benefits, the patient voiced understanding and agreed to proceed.  ABILIFY (ARIPIPRAZOLE) Risks, benefits, alternatives discussed with patient including increased energy, exacerbation of irritability, akathisia, GI upset, orthostatic hypotension, increased appetite, tardive dyskinesia.  After discussion of these risks and benefits, the patient voiced understanding and agreed to proceed.    Medications:   New Medications Ordered This Visit   Medications    ARIPiprazole (Abilify) 2 MG tablet     Sig: Take 1 tablet by mouth Daily.     Dispense:  30 tablet     Refill:  1    busPIRone (BUSPAR) 5 MG tablet     Sig: Take 1 tablet by mouth 3 (Three) Times a Day.     Dispense:  90 tablet     Refill:  0    escitalopram (Lexapro) 5 MG tablet     Sig: Take 1 tablet by mouth Daily.     Dispense:  90 tablet     Refill:  0      ANUJA reviewed.   Discussed medication options and treatment plan of prescribed medication as well as the risks, benefits, and side effects.  Patient is agreeable to call the office with any worsening of symptoms or onset of side effects.   Patient is agreeable to call 911 or go to the nearest ER should he/she begin having SI/HI.   Patient acknowledged, is agreeable to continue with current treatment plan, and was educated on the importance of compliance with treatment and follow-up appointments.  Addressed all questions and concerns.     Psychotherapy:    Provided minimal supportive therapy.  Functional status: Moderate symptoms OR moderate difficulty in social occupational or social functioning (51-60)  Prognosis: Fair with expectation for some response to treatment  Progress: waxing and waning but worse overall      Follow-up: Return in about 2 months (around 5/10/2025).     This document has been electronically signed by CHEVY Ordonez  March  21, 2025 11:00 EDT  Please note that portions of this note were completed with a voice recognition program.  Copied text in this note has been reviewed and is accurate as of 03/21/25

## 2025-03-21 NOTE — PATIENT INSTRUCTIONS
1.  Please return to clinic at your next scheduled visit.  Please contact the clinic ([phone]) at least 24 hours prior in the event you need to cancel.  2.  Do no harm to yourself or others.    3.  Avoid alcohol and drugs.    4.  Take all medications as prescribed.  Please contact the clinic with any concerns. If you are in need of medication refills, please call the clinic at [phone].    5. Should you want to get in touch with your provider, CHEVY Ordonez, please contact the office ([phone]), and staff will be able to page Janina directly.  6. In the event you have personal crisis, contact the following crisis numbers: Suicide Prevention Hotline 1-389.859.8605; [facility] Helpline 2-830-441-QDXF; [facility] Emergency Room [phone]; text HELLO to 382172; or 362.     SPECIFIC RECOMMENDATIONS:     1.      Medications discussed at this encounter:     New Medications Ordered This Visit   Medications    ARIPiprazole (Abilify) 2 MG tablet     Sig: Take 1 tablet by mouth Daily.     Dispense:  30 tablet     Refill:  1    busPIRone (BUSPAR) 5 MG tablet     Sig: Take 1 tablet by mouth 3 (Three) Times a Day.     Dispense:  90 tablet     Refill:  0    escitalopram (Lexapro) 5 MG tablet     Sig: Take 1 tablet by mouth Daily.     Dispense:  90 tablet     Refill:  0                       2.      Psychotherapy recommendations: We will continue therapy at future visits.     3.     Return to clinic: Return in about 2 months (around 5/10/2025).

## 2025-05-19 ENCOUNTER — OFFICE VISIT (OUTPATIENT)
Dept: BEHAVIORAL HEALTH | Facility: CLINIC | Age: 30
End: 2025-05-19
Payer: MEDICAID

## 2025-05-19 VITALS
WEIGHT: 179 LBS | DIASTOLIC BLOOD PRESSURE: 75 MMHG | SYSTOLIC BLOOD PRESSURE: 123 MMHG | HEIGHT: 59 IN | BODY MASS INDEX: 36.08 KG/M2 | HEART RATE: 81 BPM

## 2025-05-19 DIAGNOSIS — F31.32 BIPOLAR AFFECTIVE DISORDER, CURRENTLY DEPRESSED, MODERATE: Primary | ICD-10-CM

## 2025-05-19 DIAGNOSIS — F41.1 GENERALIZED ANXIETY DISORDER: ICD-10-CM

## 2025-05-19 RX ORDER — ARIPIPRAZOLE 2 MG/1
2 TABLET ORAL DAILY
Qty: 30 TABLET | Refills: 1 | Status: SHIPPED | OUTPATIENT
Start: 2025-05-19

## 2025-05-19 RX ORDER — BUSPIRONE HYDROCHLORIDE 5 MG/1
5 TABLET ORAL 3 TIMES DAILY
Qty: 90 TABLET | Refills: 0 | Status: SHIPPED | OUTPATIENT
Start: 2025-05-19

## 2025-05-19 RX ORDER — ESCITALOPRAM OXALATE 5 MG/1
5 TABLET ORAL DAILY
Qty: 90 TABLET | Refills: 0 | Status: SHIPPED | OUTPATIENT
Start: 2025-05-19

## 2025-05-19 NOTE — PROGRESS NOTES
"[facility]  Medication Management Follow-up      Record Review is below for 05/19/2025 :   5/17/2024WBC 16.24, CBC and CMP are otherwise reassuring  EKG Results:  None in record  Head Imaging:  None in record  Vital Signs:   /75   Pulse 81   Ht 149.9 cm (59.02\")   Wt 81.2 kg (179 lb)   BMI 36.13 kg/m²     Chief Complaint: Bipolar depression.     History of Present Illness:   Angelica Reynaga is a 30 y.o. female who presents today for follow-up and medication management for:    ICD-10-CM ICD-9-CM   1. Bipolar affective disorder, currently depressed, moderate  F31.32 296.52   2. Generalized anxiety disorder  F41.1 300.02       05/19/2025 Patient is taking medications as prescribed and is tolerating them well.   Bipolar Depression and Anxiety  \"Things are going pretty good\" Increased energy and mood stabilization. \"I feel even\" Currently 5 months into school for cosmetology. Progression of symptoms, frequency, and intensity is gradually improving. Patient continues to experience some feelings of being overwhelmed, feelings of sadness, low mood, crying spells, and these symptoms are causing significant distress or impairment. Patient denies psychomotor agitation, excessive anxiety and worry, anxiety, difficulty controlling the worry, restlessness, feeling keyed up or on edge, irritability, muscle tension,. Denies thinking about death and dying, suicidal ideation, planning, or intent to self-harm.  Denies AVH.  Clinically significant distress or impairment in social, occupational, or other important areas of functioning is gradually improving.    03/10/2025 Patient was experiencing akathisia while taking the vraylar.   Bipolar Depression and Anxiety  \"Things have been pretty difficult. Struggling to get up and get children to school. \"I have been feeling like I am failing at everything\" Experienced two miscarriages since our last encounter. Progression of symptoms, frequency, and intensity " "is waxing and waning but worse overall. Patient continues to experience feelings of sadness, low mood, lost of interest in usual activities, anhedonia, psychomotor agitation, excessive anxiety and worry, anxiety, difficulty controlling the worry, restlessness, feeling keyed up or on edge, irritability, muscle tension, decreased motivation PHQ-9 is 17and FLORI-7 is 13. and these symptoms are causing significant distress or impairment. Patient denies feeling worthless, guilty, hopelessness,. Denies thinking about death and dying, suicidal ideation, planning, or intent to self-harm.  Denies AVH.  Clinically significant distress or impairment in social, occupational, or other important areas of functioning is waxing and waning but worse overall.  Continue Lexapro 5 mg daily  Continue BuSpar 5 mg 3 times daily  Discontinue Vraylar   Start abilify 2 mg daily  Follow-up 1 month    11/04/2024   BIPOLAR   Reports previously experiencing postpartum depression approximately 6 years ago. She was prescribed medication but did not ever take medication as prescribed. Describes manic episodes as \"zooming.\" Patient reports that their mood and/or energy levels shift drastically, very low, and at other times very high. During their ''low'' phases, feels a lack of energy; a need to stay in bed or get extra sleep; and little or no motivation to do things they need to do, weight gain, depressed mood, hopeless, ability to function at work or socially is impaired. This is countered with a marked shift or ''switch'' in the way they feel. Energy increases above what is normal for them, and they often get many things done they would not ordinarily be able to do, hyper, irritable, \"on edge,\" aggressive, taking on too many activities, indiscretion, spending excessive amounts of money, impulsive behaviors. Decreased need for sleep. Reports being more talkative, outgoing, or sexual during these periods. Their behavior during these high periods " seems strange or annoying to others. Symptoms are severe enough to cause marked impairment in social or occupational functioning. The disturbance in mood and the change in functioning are observable.  Depression  Patient endorses gradually worsening feelings of sadness, low mood, and loss of interest in usual activities. These feelings are accompanied by anhedonia, change in appetite, losing or gaining weight, sleeping too much or not sleeping well (insomnia), fatigue and low energy most days, feeling worthless, guilty, and hopeless. Describes an inability to focus and concentrate that may interfere with daily tasks at home, work, or school. Movements that are unusually slow or agitated (a change which is often noticeable to others). Denies thinking about death and dying, suicidal ideation, planning, or intent to self-harm. These symptoms cause the patient clinically significant distress or impairment in social, occupational, or other important areas of functioning.  PHQ-9 is 14.  THE BIPOLAR SPECTRUM DIAGNOSTIC SCALE (BSDS)   Please read through the entire passage below before filling in any blanks.   Some individuals notice that their mood and/or energy levels shift drastically   from time to time___Fairly___.   These individuals notice that, at times, their mood and/or energy level is very low,   and at other times, very high_____Yes_.   During their ''low'' phases, these individuals often feel a lack of energy; a need to stay   in bed or get extra sleep; and little or no motivation to do things they need to do____Yes__.   They often put on weight during these periods___Yes___.   During their low phases, these individuals often feel ''blue'', sad all the time, or depressed___Yes___.   Sometimes, during these low phases, they feel hopeless or even suicidal__Denies____.   Their ability to function at work or socially is impaired__Denies____.   Typically, these low phases last for a few weeks, but sometimes they  last only a few days___Fairly___.   Individuals with this type of pattern may experience a period of ''normal'' mood in   between mood swings, during which their mood and energy level feels ''right'' and their   ability to function is not disturbed__Yes____.   They may then notice a marked shift or ''switch'' in the way they feel__Yes____.   Their energy increases above what is normal for them, and they often get many things done   they would not ordinarily be able to do___Yes___.   Sometimes, during these ''high'' periods, these individuals feel as if they have too   much energy or feel ''hyper''__Yes____.   Some individuals, during these high periods, may feel irritable, ''on edge'', or aggressive__Yes____.   Some individuals, during these high periods, take on too many activities at once___Fairly___.   During these high periods, some individuals may spend money in ways that cause   them trouble__Yes____.   They may be more talkative, outgoing, or sexual during these periods__Yes____.   Sometimes, their behavior during these high periods seems strange or annoying to others___Fairly___.   Sometimes, these individuals get into difficulty with co-workers or the police,   during these high periods__Fairly____.   Sometimes, they increase their alcohol or non-prescription drug use during these high periods____Fairly__.   Generalized Anxiety Disorder (FLORI)   Patient experiences excessive anxiety and worry (apprehensive expectation), occurring more days than not for at least 6 months, about a number of events or activities (such as work or school performance). Finds it difficult to control the worry. The anxiety and worry are associated with restlessness or feeling keyed up or on edge, being easily fatigued, difficulty concentrating or mind going blank, irritability, muscle tension, and sleep disturbance (difficulty falling or staying asleep, or restless, unsatisfying sleep). The anxiety, worry, or physical symptoms cause  clinically significant distress or impairment in social, occupational, or other important areas of functioning.   FLORI-7 is 13.  Continue Lexapro 5 mg daily  Continue BuSpar 5 mg 3 times daily  Start Vraylar 1.5 mg daily   Follow-up 1 month    Per Referring Provider 9/26/2024 She reports that her anxiety levels have increased since she resumed taking Zoloft a few weeks ago. Despite being consistent with the medication, she has not noticed any improvement in her symptoms. She denies experiencing suicidal thoughts or depression, but acknowledges that her anxiety is the primary concern. She also mentions that the father of her children suggested she might have bipolar disorder due to her high-risk sexual behaviors and spending issues, which she attributes to manic episodes associated with her depression.  FAMILY HISTORY  Her father was diagnosed with depression. Her mother was diagnosed with anxiety.  No Known family history of bipolar    Past Psychiatric History:  Began Treatment: Less than 6 months  Diagnoses: Depression, anxiety, PPD  Psychiatrist: Denies  Therapist: Denies  Admission History: Denies  Medication Trials: zoloft, lexapro, buspar, vraylar  Family history suicide attempts: Denies  Family history suicide completions: Denies  Suicide Attempts: Denies  Self Harm: Hx of cutting, adolescence  Substance use/abuse:Occasional alcohol  Withdrawal Symptoms: Not applicable  Longest Period Sober: Not applicable  AA: Not applicable  Trauma/Childhood/ACE: **  Access to Firearms: **    Safety/Risk Assessment: Risk of self-harm acutely and chronically is mild to moderate.    Static/Dynamic risk factors include diagnosis of mental disorder, psychosocial stressors,Previous self-harm, Recent stressor or loss, and Social factors.      MENTAL STATUS EXAM   General Appearance:  Cleanly groomed and dressed and well developed  Eye Contact:  Good eye contact  Attitude:  Cooperative and polite  Motor Activity:  Normal gait,  posture  Speech:  Normal rate, tone, volume  Mood and affect:  Normal, pleasant and euthymic  Hopelessness:  Denies  Thought Process:  Logical and goal-directed  Associations/ Thought Content:  No delusions  Hallucinations:  None  Suicidal Ideations:  Not present  Homicidal Ideation:  Not present  Sensorium:  Alert  Orientation:  Person, place, time and situation  Immediate Recall, Recent, and Remote Memory:  Intact  Attention Span/ Concentration:  Good  Fund of Knowledge:  Appropriate for age and educational level  Intellectual Functioning:  Average range  Insight:  Good  Judgement:  Good  Reliability:  Good  Impulse Control:  Good       Review of systems is negative except as noted in HPI.  Labs:  WBC   Date Value Ref Range Status   06/02/2025 9.03 3.40 - 10.80 10*3/mm3 Final     Platelets   Date Value Ref Range Status   06/02/2025 361 140 - 450 10*3/mm3 Final     Hemoglobin   Date Value Ref Range Status   06/02/2025 12.7 12.0 - 15.9 g/dL Final     Hematocrit   Date Value Ref Range Status   06/02/2025 38.7 34.0 - 46.6 % Final     Glucose   Date Value Ref Range Status   06/02/2025 83 65 - 99 mg/dL Final     Creatinine   Date Value Ref Range Status   06/02/2025 0.84 0.57 - 1.00 mg/dL Final     ALT (SGPT)   Date Value Ref Range Status   06/02/2025 16 1 - 33 U/L Final     AST (SGOT)   Date Value Ref Range Status   06/02/2025 17 1 - 32 U/L Final     BUN   Date Value Ref Range Status   06/02/2025 13.0 6.0 - 20.0 mg/dL Final     eGFR   Date Value Ref Range Status   06/02/2025 96.6 >60.0 mL/min/1.73 Final     Total Cholesterol   Date Value Ref Range Status   06/02/2025 162 0 - 200 mg/dL Final     Triglycerides   Date Value Ref Range Status   06/02/2025 57 0 - 150 mg/dL Final     HDL Cholesterol   Date Value Ref Range Status   06/02/2025 47 40 - 60 mg/dL Final     LDL Cholesterol    Date Value Ref Range Status   06/02/2025 104 (H) 0 - 100 mg/dL Final     VLDL Cholesterol   Date Value Ref Range Status   06/02/2025 11 5 - 40  mg/dL Final     LDL/HDL Ratio   Date Value Ref Range Status   06/02/2025 2.20  Final     TSH   Date Value Ref Range Status   06/02/2025 4.070 0.270 - 4.200 uIU/mL Final      Pain Management Panel           No data to display               Imaging Results:  US Ob Transvaginal  Result Date: 2/27/2025  Impression: 1.No intrauterine pregnancy is visualized. 2.2.2 cm thin-walled cystic extraovarian right adnexal mass with peripheral nodularity does not contain a yolk sac or embryo, but is indeterminate for ectopic pregnancy given absence of intrauterine pregnancy. This could possibly represent sequela of previous ectopic pregnancy that was treated with methotrexate in December 2024.  I called this result to Charles AGUERO at 5:00 p.m. on 2/27/2025. 3.Recommend obstetric evaluation, correlation with clinical findings, close clinical follow-up, and follow-up ultrasound as clinically indicated. Electronically Signed: Siri Allison  2/27/2025 5:02 PM EST  Workstation ID: [id number]    Current Medications:   Current Outpatient Medications   Medication Sig Dispense Refill    ARIPiprazole (Abilify) 2 MG tablet Take 1 tablet by mouth Daily. 30 tablet 1    busPIRone (BUSPAR) 5 MG tablet Take 1 tablet by mouth 3 (Three) Times a Day. 90 tablet 0    escitalopram (Lexapro) 5 MG tablet Take 1 tablet by mouth Daily. 90 tablet 0     No current facility-administered medications for this visit.     Problem List:  Patient Active Problem List   Diagnosis    Anxiety    Family history of cleft lip    Hemorrhoids    Ovarian cyst    Ectopic pregnancy without intrauterine pregnancy     Allergy:   Allergies   Allergen Reactions    Iodinated Contrast Media Hives      Discontinued Medications:  Medications Discontinued During This Encounter   Medication Reason    ARIPiprazole (Abilify) 2 MG tablet Reorder    busPIRone (BUSPAR) 5 MG tablet Reorder    escitalopram (Lexapro) 5 MG tablet Reorder       PLAN:   Presentation meets DSM-V criteria  for:  Diagnoses and all orders for this visit:    1. Bipolar affective disorder, currently depressed, moderate (Primary)  -     ARIPiprazole (Abilify) 2 MG tablet; Take 1 tablet by mouth Daily.  Dispense: 30 tablet; Refill: 1  -     escitalopram (Lexapro) 5 MG tablet; Take 1 tablet by mouth Daily.  Dispense: 90 tablet; Refill: 0    2. Generalized anxiety disorder  -     ARIPiprazole (Abilify) 2 MG tablet; Take 1 tablet by mouth Daily.  Dispense: 30 tablet; Refill: 1  -     busPIRone (BUSPAR) 5 MG tablet; Take 1 tablet by mouth 3 (Three) Times a Day.  Dispense: 90 tablet; Refill: 0  -     escitalopram (Lexapro) 5 MG tablet; Take 1 tablet by mouth Daily.  Dispense: 90 tablet; Refill: 0       Continue Lexapro 5 mg daily  Continue BuSpar 5 mg 3 times daily  Continue abilify 2 mg daily  Medication Education:   LEXAPRO (ESCITALOPRAM)  Risks, benefits, alternatives discussed with patient including GI upset, nausea vomiting diarrhea, theoretical decrease of seizure threshold predisposing the patient to a slightly higher seizure risk, headaches, sexual dysfunction, serotonin syndrome, bleeding risk.  After discussion of these risks and benefits, the patient voiced understanding and agreed to proceed.  BUSPAR (BUSPIRONE) Risks, benefits, alternatives discussed with patient including nausea, GI upset, mild sedation, falls risk.  After discussion of these risks and benefits, the patient voiced understanding and agreed to proceed.  ABILIFY (ARIPIPRAZOLE) Risks, benefits, alternatives discussed with patient including increased energy, exacerbation of irritability, akathisia, GI upset, orthostatic hypotension, increased appetite, tardive dyskinesia.  After discussion of these risks and benefits, the patient voiced understanding and agreed to proceed.  Medications:   New Medications Ordered This Visit   Medications    ARIPiprazole (Abilify) 2 MG tablet     Sig: Take 1 tablet by mouth Daily.     Dispense:  30 tablet     Refill:  1     busPIRone (BUSPAR) 5 MG tablet     Sig: Take 1 tablet by mouth 3 (Three) Times a Day.     Dispense:  90 tablet     Refill:  0    escitalopram (Lexapro) 5 MG tablet     Sig: Take 1 tablet by mouth Daily.     Dispense:  90 tablet     Refill:  0      ANUJA reviewed.   Discussed medication options and treatment plan of prescribed medication as well as the risks, benefits, and side effects.  Patient is agreeable to call the office with any worsening of symptoms or onset of side effects.   Patient is agreeable to call 911 or go to the nearest ER should he/she begin having SI/HI.   Patient acknowledged, is agreeable to continue with current treatment plan, and was educated on the importance of compliance with treatment and follow-up appointments.  Addressed all questions and concerns.     Psychotherapy:    Provided minimal supportive therapy.  Functional status: Some mild symptoms or some difficulty in social, occupational, or school functioning, but generally functioning pretty well, has some meaningful interpersonal relationships (61-78)  Prognosis: Good chance of responding well to treatment   Progress: gradually improving      Follow-up: Return in about 2 months (around 7/19/2025).     This document has been electronically signed by CHEVY Ordonez  June 4, 2025 07:55 EDT  Please note that portions of this note were completed with a voice recognition program.  Copied text in this note has been reviewed and is accurate as of 06/04/25

## 2025-05-19 NOTE — PATIENT INSTRUCTIONS
1.  Please return to clinic at your next scheduled visit.  Please contact the clinic ([phone]) at least 24 hours prior in the event you need to cancel.  2.  Do no harm to yourself or others.    3.  Avoid alcohol and drugs.    4.  Take all medications as prescribed.  Please contact the clinic with any concerns. If you are in need of medication refills, please call the clinic at [phone].    5. Should you want to get in touch with your provider, CHEVY Ordonez, please contact the office ([phone]), and staff will be able to page Janina directly.  6. In the event you have personal crisis, contact the following crisis numbers: Suicide Prevention Hotline 1-348.314.6058; [facility] Helpline 9-666-932-OBWN; [facility] Emergency Room [phone]; text HELLO to 950755; or 611.     SPECIFIC RECOMMENDATIONS:     1.      Medications discussed at this encounter:     New Medications Ordered This Visit   Medications    ARIPiprazole (Abilify) 2 MG tablet     Sig: Take 1 tablet by mouth Daily.     Dispense:  30 tablet     Refill:  1    busPIRone (BUSPAR) 5 MG tablet     Sig: Take 1 tablet by mouth 3 (Three) Times a Day.     Dispense:  90 tablet     Refill:  0    escitalopram (Lexapro) 5 MG tablet     Sig: Take 1 tablet by mouth Daily.     Dispense:  90 tablet     Refill:  0                       2.      Psychotherapy recommendations: We will continue therapy at future visits.     3.     Return to clinic: Return in about 2 months (around 7/19/2025).

## 2025-06-02 ENCOUNTER — OFFICE VISIT (OUTPATIENT)
Dept: INTERNAL MEDICINE | Facility: CLINIC | Age: 30
End: 2025-06-02
Payer: MEDICAID

## 2025-06-02 VITALS
TEMPERATURE: 97.1 F | RESPIRATION RATE: 18 BRPM | WEIGHT: 176 LBS | SYSTOLIC BLOOD PRESSURE: 122 MMHG | OXYGEN SATURATION: 98 % | DIASTOLIC BLOOD PRESSURE: 68 MMHG | HEART RATE: 74 BPM | BODY MASS INDEX: 35.48 KG/M2 | HEIGHT: 59 IN

## 2025-06-02 DIAGNOSIS — F41.1 GAD (GENERALIZED ANXIETY DISORDER): ICD-10-CM

## 2025-06-02 DIAGNOSIS — F33.0 MAJOR DEPRESSIVE DISORDER, RECURRENT, MILD: ICD-10-CM

## 2025-06-02 DIAGNOSIS — J30.9 ALLERGIC RHINITIS, UNSPECIFIED SEASONALITY, UNSPECIFIED TRIGGER: ICD-10-CM

## 2025-06-02 DIAGNOSIS — Z00.00 ANNUAL PHYSICAL EXAM: Primary | ICD-10-CM

## 2025-06-02 LAB
ALBUMIN SERPL-MCNC: 3.9 G/DL (ref 3.5–5.2)
ALBUMIN/GLOB SERPL: 1.2 G/DL
ALP SERPL-CCNC: 62 U/L (ref 39–117)
ALT SERPL W P-5'-P-CCNC: 16 U/L (ref 1–33)
ANION GAP SERPL CALCULATED.3IONS-SCNC: 8.1 MMOL/L (ref 5–15)
AST SERPL-CCNC: 17 U/L (ref 1–32)
BASOPHILS # BLD AUTO: 0.07 10*3/MM3 (ref 0–0.2)
BASOPHILS NFR BLD AUTO: 0.8 % (ref 0–1.5)
BILIRUB SERPL-MCNC: 0.3 MG/DL (ref 0–1.2)
BUN SERPL-MCNC: 13 MG/DL (ref 6–20)
BUN/CREAT SERPL: 15.5 (ref 7–25)
CALCIUM SPEC-SCNC: 9 MG/DL (ref 8.6–10.5)
CHLORIDE SERPL-SCNC: 105 MMOL/L (ref 98–107)
CHOLEST SERPL-MCNC: 162 MG/DL (ref 0–200)
CO2 SERPL-SCNC: 27.9 MMOL/L (ref 22–29)
CREAT SERPL-MCNC: 0.84 MG/DL (ref 0.57–1)
DEPRECATED RDW RBC AUTO: 39.4 FL (ref 37–54)
EGFRCR SERPLBLD CKD-EPI 2021: 96.6 ML/MIN/1.73
EOSINOPHIL # BLD AUTO: 0.83 10*3/MM3 (ref 0–0.4)
EOSINOPHIL NFR BLD AUTO: 9.2 % (ref 0.3–6.2)
ERYTHROCYTE [DISTWIDTH] IN BLOOD BY AUTOMATED COUNT: 12.5 % (ref 12.3–15.4)
GLOBULIN UR ELPH-MCNC: 3.3 GM/DL
GLUCOSE SERPL-MCNC: 83 MG/DL (ref 65–99)
HCT VFR BLD AUTO: 38.7 % (ref 34–46.6)
HDLC SERPL-MCNC: 47 MG/DL (ref 40–60)
HGB BLD-MCNC: 12.7 G/DL (ref 12–15.9)
IMM GRANULOCYTES # BLD AUTO: 0.02 10*3/MM3 (ref 0–0.05)
IMM GRANULOCYTES NFR BLD AUTO: 0.2 % (ref 0–0.5)
LDLC SERPL CALC-MCNC: 104 MG/DL (ref 0–100)
LDLC/HDLC SERPL: 2.2 {RATIO}
LYMPHOCYTES # BLD AUTO: 3.36 10*3/MM3 (ref 0.7–3.1)
LYMPHOCYTES NFR BLD AUTO: 37.2 % (ref 19.6–45.3)
MCH RBC QN AUTO: 28.4 PG (ref 26.6–33)
MCHC RBC AUTO-ENTMCNC: 32.8 G/DL (ref 31.5–35.7)
MCV RBC AUTO: 86.6 FL (ref 79–97)
MONOCYTES # BLD AUTO: 0.69 10*3/MM3 (ref 0.1–0.9)
MONOCYTES NFR BLD AUTO: 7.6 % (ref 5–12)
NEUTROPHILS NFR BLD AUTO: 4.06 10*3/MM3 (ref 1.7–7)
NEUTROPHILS NFR BLD AUTO: 45 % (ref 42.7–76)
NRBC BLD AUTO-RTO: 0 /100 WBC (ref 0–0.2)
PLATELET # BLD AUTO: 361 10*3/MM3 (ref 140–450)
PMV BLD AUTO: 9.5 FL (ref 6–12)
POTASSIUM SERPL-SCNC: 4.1 MMOL/L (ref 3.5–5.2)
PROT SERPL-MCNC: 7.2 G/DL (ref 6–8.5)
RBC # BLD AUTO: 4.47 10*6/MM3 (ref 3.77–5.28)
SODIUM SERPL-SCNC: 141 MMOL/L (ref 136–145)
TRIGL SERPL-MCNC: 57 MG/DL (ref 0–150)
TSH SERPL DL<=0.05 MIU/L-ACNC: 4.07 UIU/ML (ref 0.27–4.2)
VLDLC SERPL-MCNC: 11 MG/DL (ref 5–40)
WBC NRBC COR # BLD AUTO: 9.03 10*3/MM3 (ref 3.4–10.8)

## 2025-06-02 PROCEDURE — 84443 ASSAY THYROID STIM HORMONE: CPT | Performed by: NURSE PRACTITIONER

## 2025-06-02 PROCEDURE — 85025 COMPLETE CBC W/AUTO DIFF WBC: CPT | Performed by: NURSE PRACTITIONER

## 2025-06-02 PROCEDURE — 80061 LIPID PANEL: CPT | Performed by: NURSE PRACTITIONER

## 2025-06-02 PROCEDURE — 2014F MENTAL STATUS ASSESS: CPT | Performed by: NURSE PRACTITIONER

## 2025-06-02 PROCEDURE — 36415 COLL VENOUS BLD VENIPUNCTURE: CPT | Performed by: NURSE PRACTITIONER

## 2025-06-02 PROCEDURE — 99395 PREV VISIT EST AGE 18-39: CPT | Performed by: NURSE PRACTITIONER

## 2025-06-02 PROCEDURE — 1125F AMNT PAIN NOTED PAIN PRSNT: CPT | Performed by: NURSE PRACTITIONER

## 2025-06-02 PROCEDURE — 80053 COMPREHEN METABOLIC PANEL: CPT | Performed by: NURSE PRACTITIONER

## 2025-06-02 NOTE — PROGRESS NOTES
"Chief Complaint  Annual Exam      Subjective      History of Present Illness  The patient is a 30-year-old female presenting for her annual physical examination.    She reports a positive response to her current pharmacological regimen with no adverse effects noted. The patient is actively monitoring her diet and has experienced weight loss since her last visit. She denies any suicidal or homicidal ideations and continues to attend regular psychotherapy sessions.    The patient has a history of ectopic pregnancy and miscarriage in February 2025. She is not currently attempting to conceive and is utilizing contraceptive measures.    She reports persistent nasal congestion attributed to allergic rhinitis, which is managed with over-the-counter non-drowsy antihistamines.         Objective   Vital Signs:   Vitals:    06/02/25 1008   BP: 122/68   BP Location: Left arm   Patient Position: Sitting   Cuff Size: Adult   Pulse: 74   Resp: 18   Temp: 97.1 °F (36.2 °C)   TempSrc: Temporal   SpO2: 98%   Weight: 79.8 kg (176 lb)   Height: 149.9 cm (59.02\")     Body mass index is 35.52 kg/m².    Wt Readings from Last 3 Encounters:   06/02/25 79.8 kg (176 lb)   05/19/25 81.2 kg (179 lb)   03/27/25 77.9 kg (171 lb 11.2 oz)     BP Readings from Last 3 Encounters:   06/02/25 122/68   05/19/25 123/75   03/27/25 118/75       Health Maintenance   Topic Date Due    TDAP/TD VACCINES (2 - Td or Tdap) 08/13/2017    COVID-19 Vaccine (1 - 2024-25 season) Never done    ANNUAL PHYSICAL  05/31/2025    INFLUENZA VACCINE  07/01/2025    PAP SMEAR  10/03/2026    HEPATITIS C SCREENING  Completed    Pneumococcal Vaccine 0-49  Aged Out    CHLAMYDIA SCREENING  Discontinued       Physical Exam  Vitals and nursing note reviewed.   Constitutional:       General: She is not in acute distress.     Appearance: Normal appearance.   HENT:      Head: Normocephalic and atraumatic.      Right Ear: Tympanic membrane, ear canal and external ear normal.      Left Ear: " Tympanic membrane, ear canal and external ear normal.      Nose: Nose normal.      Mouth/Throat:      Mouth: Mucous membranes are moist.   Eyes:      Conjunctiva/sclera: Conjunctivae normal.   Cardiovascular:      Rate and Rhythm: Normal rate and regular rhythm.      Pulses: Normal pulses.      Heart sounds: Normal heart sounds. No murmur heard.     No friction rub. No gallop.   Pulmonary:      Effort: Pulmonary effort is normal. No respiratory distress.      Breath sounds: No wheezing, rhonchi or rales.   Musculoskeletal:      Cervical back: Neck supple.      Right lower leg: No edema.      Left lower leg: No edema.   Lymphadenopathy:      Cervical: No cervical adenopathy.   Skin:     General: Skin is warm and dry.   Neurological:      General: No focal deficit present.      Mental Status: She is alert and oriented to person, place, and time.   Psychiatric:         Mood and Affect: Mood normal.         Behavior: Behavior normal.        Physical Exam        Result Review :  The following data was reviewed by: CHEVY Correa on 06/02/2025:         Results              Procedures            Assessment & Plan  Annual physical exam    Orders:    Comprehensive Metabolic Panel    CBC & Differential    TSH    Lipid Panel    FLORI (generalized anxiety disorder)           Major depressive disorder, recurrent, mild           Allergic rhinitis, unspecified seasonality, unspecified trigger              Assessment & Plan  1. Annual physical examination  - Blood pressure normal  - Slight weight decrease since last visit  - Pap smear up to date (3 years ago)  - Ordered comprehensive lab work: cholesterol, kidney and liver function, blood count, thyroid tests    2. Allergic rhinitis  - Persistent nasal congestion due to allergies  - Nasal congestion on exam  - Continue current OTC non-drowsy allergy medication    3. History of ectopic pregnancy and miscarriage  - Ectopic pregnancy and miscarriage in 02/2025  - Not trying to  conceive, using contraceptive measures    4.  Anxiety/depression  - Currently doing well at this time.  - Follow-up with psychiatry  19 to 39: Counseling/Anticipatory Guidance Discussed: nutrition, family planning/contraception, physical activity, healthy weight, injury prevention, mental health, and immunizations    Patient or patient representative verbalized consent for the use of Ambient Listening during the visit with  CHEVY Correa for chart documentation. 6/2/2025  14:45 EDT      FOLLOW UP  Return in about 1 year (around 6/2/2026).  Patient was given instructions and counseling regarding her condition or for health maintenance advice. Please see specific information pulled into the AVS if appropriate.     CHEVY Correa  06/02/25  14:45 EDT    CURRENT & DISCONTINUED MEDICATIONS  Current Outpatient Medications   Medication Instructions    ARIPiprazole (ABILIFY) 2 mg, Oral, Daily    busPIRone (BUSPAR) 5 mg, Oral, 3 Times Daily    escitalopram (LEXAPRO) 5 mg, Oral, Daily       There are no discontinued medications.

## 2025-07-29 ENCOUNTER — OFFICE VISIT (OUTPATIENT)
Dept: BEHAVIORAL HEALTH | Facility: CLINIC | Age: 30
End: 2025-07-29
Payer: MEDICAID

## 2025-07-29 VITALS
BODY MASS INDEX: 35.48 KG/M2 | HEIGHT: 59 IN | DIASTOLIC BLOOD PRESSURE: 62 MMHG | WEIGHT: 176 LBS | SYSTOLIC BLOOD PRESSURE: 112 MMHG | HEART RATE: 67 BPM

## 2025-07-29 DIAGNOSIS — F41.1 GENERALIZED ANXIETY DISORDER: ICD-10-CM

## 2025-07-29 DIAGNOSIS — F31.32 BIPOLAR AFFECTIVE DISORDER, CURRENTLY DEPRESSED, MODERATE: Primary | ICD-10-CM

## 2025-07-29 RX ORDER — ARIPIPRAZOLE 2 MG/1
2 TABLET ORAL DAILY
Qty: 30 TABLET | Refills: 1 | Status: SHIPPED | OUTPATIENT
Start: 2025-07-29

## 2025-07-29 RX ORDER — ESCITALOPRAM OXALATE 10 MG/1
10 TABLET ORAL DAILY
Qty: 30 TABLET | Refills: 1 | Status: SHIPPED | OUTPATIENT
Start: 2025-07-29

## 2025-07-29 NOTE — PROGRESS NOTES
"[facility]  Medication Management Follow-up      Record Review is below for 07/29/2025 :   6/2/2025 TSH is 4.070CBC and CMP are reassuring  EKG Results:  No current or pertinent labs in record  Head Imaging:  No current or pertinent labs in record  Vital Signs:   /62   Pulse 67   Ht 149.9 cm (59.02\")   Wt 79.8 kg (176 lb)   BMI 35.53 kg/m²     Chief Complaint: Bipolar depression. Anxiety.     History of Present Illness:   Angelica Reynaga is a 30 y.o. female who presents today for follow-up and medication management for:    ICD-10-CM ICD-9-CM   1. Bipolar affective disorder, currently depressed, moderate  F31.32 296.52   2. Generalized anxiety disorder  F41.1 300.02       07/29/2025 Patient is taking medications as prescribed and is tolerating them well.   Bipolar Depression and Anxiety  School is going well. Reports some reemergence of anxiety symptoms. Progression of symptoms, frequency, and intensity is unchanged and symptoms have progressed to a point and plateaued. Patient continues to experience psychomotor agitation, excessive anxiety and worry, anxiety, difficulty controlling the worry, restlessness, irritability, muscle tension, and these symptoms are causing significant distress or impairment. Patient denies feeling worthless, guilty, hopelessness,. Denies thinking about death and dying, suicidal ideation, planning, or intent to self-harm.  Denies AVH.  Clinically significant distress or impairment in social, occupational, or other important areas of functioning is symptoms have progressed to a point and plateaued.      05/19/2025 Patient is taking medications as prescribed and is tolerating them well.   Bipolar Depression and Anxiety  \"Things are going pretty good\" Increased energy and mood stabilization. \"I feel even\" Currently 5 months into school for cosmetology. Progression of symptoms, frequency, and intensity is gradually improving. Patient continues to experience some " "feelings of being overwhelmed, feelings of sadness, low mood, crying spells, and these symptoms are causing significant distress or impairment. Patient denies psychomotor agitation, excessive anxiety and worry, anxiety, difficulty controlling the worry, restlessness, feeling keyed up or on edge, irritability, muscle tension,. Denies thinking about death and dying, suicidal ideation, planning, or intent to self-harm.  Denies AVH.  Clinically significant distress or impairment in social, occupational, or other important areas of functioning is gradually improving.  Continue Lexapro 5 mg daily  Continue BuSpar 5 mg 3 times daily  Continue abilify 2 mg daily    03/10/2025 Patient was experiencing akathisia while taking the vraylar.   Bipolar Depression and Anxiety  \"Things have been pretty difficult. Struggling to get up and get children to school. \"I have been feeling like I am failing at everything\" Experienced two miscarriages since our last encounter. Progression of symptoms, frequency, and intensity is waxing and waning but worse overall. Patient continues to experience feelings of sadness, low mood, lost of interest in usual activities, anhedonia, psychomotor agitation, excessive anxiety and worry, anxiety, difficulty controlling the worry, restlessness, feeling keyed up or on edge, irritability, muscle tension, decreased motivation PHQ-9 is 17and FLORI-7 is 13. and these symptoms are causing significant distress or impairment. Patient denies feeling worthless, guilty, hopelessness,. Denies thinking about death and dying, suicidal ideation, planning, or intent to self-harm.  Denies AVH.  Clinically significant distress or impairment in social, occupational, or other important areas of functioning is waxing and waning but worse overall.  Continue Lexapro 5 mg daily  Continue BuSpar 5 mg 3 times daily  Discontinue Vraylar   Start abilify 2 mg daily  Follow-up 1 month    11/04/2024   BIPOLAR   Reports previously " "experiencing postpartum depression approximately 6 years ago. She was prescribed medication but did not ever take medication as prescribed. Describes manic episodes as \"zooming.\" Patient reports that their mood and/or energy levels shift drastically, very low, and at other times very high. During their ''low'' phases, feels a lack of energy; a need to stay in bed or get extra sleep; and little or no motivation to do things they need to do, weight gain, depressed mood, hopeless, ability to function at work or socially is impaired. This is countered with a marked shift or ''switch'' in the way they feel. Energy increases above what is normal for them, and they often get many things done they would not ordinarily be able to do, hyper, irritable, \"on edge,\" aggressive, taking on too many activities, indiscretion, spending excessive amounts of money, impulsive behaviors. Decreased need for sleep. Reports being more talkative, outgoing, or sexual during these periods. Their behavior during these high periods seems strange or annoying to others. Symptoms are severe enough to cause marked impairment in social or occupational functioning. The disturbance in mood and the change in functioning are observable.  Depression  Patient endorses gradually worsening feelings of sadness, low mood, and loss of interest in usual activities. These feelings are accompanied by anhedonia, change in appetite, losing or gaining weight, sleeping too much or not sleeping well (insomnia), fatigue and low energy most days, feeling worthless, guilty, and hopeless. Describes an inability to focus and concentrate that may interfere with daily tasks at home, work, or school. Movements that are unusually slow or agitated (a change which is often noticeable to others). Denies thinking about death and dying, suicidal ideation, planning, or intent to self-harm. These symptoms cause the patient clinically significant distress or impairment in social, " occupational, or other important areas of functioning.  PHQ-9 is 14.  THE BIPOLAR SPECTRUM DIAGNOSTIC SCALE (BSDS)   Please read through the entire passage below before filling in any blanks.   Some individuals notice that their mood and/or energy levels shift drastically   from time to time___Fairly___.   These individuals notice that, at times, their mood and/or energy level is very low,   and at other times, very high_____Yes_.   During their ''low'' phases, these individuals often feel a lack of energy; a need to stay   in bed or get extra sleep; and little or no motivation to do things they need to do____Yes__.   They often put on weight during these periods___Yes___.   During their low phases, these individuals often feel ''blue'', sad all the time, or depressed___Yes___.   Sometimes, during these low phases, they feel hopeless or even suicidal__Denies____.   Their ability to function at work or socially is impaired__Denies____.   Typically, these low phases last for a few weeks, but sometimes they last only a few days___Fairly___.   Individuals with this type of pattern may experience a period of ''normal'' mood in   between mood swings, during which their mood and energy level feels ''right'' and their   ability to function is not disturbed__Yes____.   They may then notice a marked shift or ''switch'' in the way they feel__Yes____.   Their energy increases above what is normal for them, and they often get many things done   they would not ordinarily be able to do___Yes___.   Sometimes, during these ''high'' periods, these individuals feel as if they have too   much energy or feel ''hyper''__Yes____.   Some individuals, during these high periods, may feel irritable, ''on edge'', or aggressive__Yes____.   Some individuals, during these high periods, take on too many activities at once___Fairly___.   During these high periods, some individuals may spend money in ways that cause   them trouble__Yes____.   They  may be more talkative, outgoing, or sexual during these periods__Yes____.   Sometimes, their behavior during these high periods seems strange or annoying to others___Fairly___.   Sometimes, these individuals get into difficulty with co-workers or the police,   during these high periods__Fairly____.   Sometimes, they increase their alcohol or non-prescription drug use during these high periods____Fairly__.   Generalized Anxiety Disorder (FLORI)   Patient experiences excessive anxiety and worry (apprehensive expectation), occurring more days than not for at least 6 months, about a number of events or activities (such as work or school performance). Finds it difficult to control the worry. The anxiety and worry are associated with restlessness or feeling keyed up or on edge, being easily fatigued, difficulty concentrating or mind going blank, irritability, muscle tension, and sleep disturbance (difficulty falling or staying asleep, or restless, unsatisfying sleep). The anxiety, worry, or physical symptoms cause clinically significant distress or impairment in social, occupational, or other important areas of functioning.   FLORI-7 is 13.  Continue Lexapro 5 mg daily  Continue BuSpar 5 mg 3 times daily  Start Vraylar 1.5 mg daily   Follow-up 1 month    Per Referring Provider 9/26/2024 She reports that her anxiety levels have increased since she resumed taking Zoloft a few weeks ago. Despite being consistent with the medication, she has not noticed any improvement in her symptoms. She denies experiencing suicidal thoughts or depression, but acknowledges that her anxiety is the primary concern. She also mentions that the father of her children suggested she might have bipolar disorder due to her high-risk sexual behaviors and spending issues, which she attributes to manic episodes associated with her depression.  FAMILY HISTORY  Her father was diagnosed with depression. Her mother was diagnosed with anxiety.  No Known family  history of bipolar    Past Psychiatric History:  Began Treatment: Less than 6 months  Diagnoses: Depression, anxiety, PPD  Psychiatrist: Denies  Therapist: Denies  Admission History: Denies  Medication Trials: zoloft, lexapro, buspar, vraylar  Family history suicide attempts: Denies  Family history suicide completions: Denies  Suicide Attempts: Denies  Self Harm: Hx of cutting, adolescence  Substance use/abuse:Occasional alcohol  Withdrawal Symptoms: Not applicable  Longest Period Sober: Not applicable  AA: Not applicable  Trauma/Childhood/ACE: **  Access to Firearms: **    Safety/Risk Assessment: Risk of self-harm acutely and chronically is mild to moderate.    Static/Dynamic risk factors include diagnosis of mental disorder, psychosocial stressors,Recent stressor or loss and Social factors.      MENTAL STATUS EXAM   General Appearance:  Cleanly groomed and dressed and well developed  Eye Contact:  Good eye contact  Attitude:  Cooperative and polite  Motor Activity:  Normal gait, posture  Speech:  Normal rate, tone, volume  Mood and affect:  Normal, pleasant and euthymic  Hopelessness:  Denies  Thought Process:  Logical and goal-directed  Associations/ Thought Content:  No delusions  Hallucinations:  None  Suicidal Ideations:  Not present  Homicidal Ideation:  Not present  Sensorium:  Alert  Orientation:  Person, place, time and situation  Immediate Recall, Recent, and Remote Memory:  Intact  Attention Span/ Concentration:  Good  Fund of Knowledge:  Appropriate for age and educational level  Intellectual Functioning:  Average range  Insight:  Good  Judgement:  Good  Reliability:  Good  Impulse Control:  Good       Review of systems is negative except as noted in HPI.  Labs:  WBC   Date Value Ref Range Status   06/02/2025 9.03 3.40 - 10.80 10*3/mm3 Final     Platelets   Date Value Ref Range Status   06/02/2025 361 140 - 450 10*3/mm3 Final     Hemoglobin   Date Value Ref Range Status   06/02/2025 12.7 12.0 - 15.9  g/dL Final     Hematocrit   Date Value Ref Range Status   06/02/2025 38.7 34.0 - 46.6 % Final     Glucose   Date Value Ref Range Status   06/02/2025 83 65 - 99 mg/dL Final     Creatinine   Date Value Ref Range Status   06/02/2025 0.84 0.57 - 1.00 mg/dL Final     ALT (SGPT)   Date Value Ref Range Status   06/02/2025 16 1 - 33 U/L Final     AST (SGOT)   Date Value Ref Range Status   06/02/2025 17 1 - 32 U/L Final     BUN   Date Value Ref Range Status   06/02/2025 13.0 6.0 - 20.0 mg/dL Final     eGFR   Date Value Ref Range Status   06/02/2025 96.6 >60.0 mL/min/1.73 Final     Total Cholesterol   Date Value Ref Range Status   06/02/2025 162 0 - 200 mg/dL Final     Triglycerides   Date Value Ref Range Status   06/02/2025 57 0 - 150 mg/dL Final     HDL Cholesterol   Date Value Ref Range Status   06/02/2025 47 40 - 60 mg/dL Final     LDL Cholesterol    Date Value Ref Range Status   06/02/2025 104 (H) 0 - 100 mg/dL Final     VLDL Cholesterol   Date Value Ref Range Status   06/02/2025 11 5 - 40 mg/dL Final     LDL/HDL Ratio   Date Value Ref Range Status   06/02/2025 2.20  Final     TSH   Date Value Ref Range Status   06/02/2025 4.070 0.270 - 4.200 uIU/mL Final      Pain Management Panel           No data to display               Imaging Results:  No Images in the past 120 days found..  Current Medications:   Current Outpatient Medications   Medication Sig Dispense Refill    ARIPiprazole (Abilify) 2 MG tablet Take 1 tablet by mouth Daily. 30 tablet 1    busPIRone (BUSPAR) 5 MG tablet Take 1 tablet by mouth 3 (Three) Times a Day. 90 tablet 0    escitalopram (Lexapro) 10 MG tablet Take 1 tablet by mouth Daily. 30 tablet 1     No current facility-administered medications for this visit.     Problem List:  Patient Active Problem List   Diagnosis    Anxiety    Family history of cleft lip    Hemorrhoids    Ovarian cyst    Ectopic pregnancy without intrauterine pregnancy     Allergy:   Allergies   Allergen Reactions    Iodinated  Contrast Media Hives      Discontinued Medications:  Medications Discontinued During This Encounter   Medication Reason    ARIPiprazole (Abilify) 2 MG tablet Reorder    escitalopram (Lexapro) 5 MG tablet        PLAN:   Presentation meets DSM-V criteria for:  Diagnoses and all orders for this visit:    1. Bipolar affective disorder, currently depressed, moderate (Primary)  -     escitalopram (Lexapro) 10 MG tablet; Take 1 tablet by mouth Daily.  Dispense: 30 tablet; Refill: 1  -     ARIPiprazole (Abilify) 2 MG tablet; Take 1 tablet by mouth Daily.  Dispense: 30 tablet; Refill: 1    2. Generalized anxiety disorder  -     escitalopram (Lexapro) 10 MG tablet; Take 1 tablet by mouth Daily.  Dispense: 30 tablet; Refill: 1  -     ARIPiprazole (Abilify) 2 MG tablet; Take 1 tablet by mouth Daily.  Dispense: 30 tablet; Refill: 1       Increase Lexapro to 10 mg daily  Continue BuSpar 5 mg 3 times daily  Continue abilify 2 mg daily  Medication Education:   LEXAPRO (ESCITALOPRAM)  Risks, benefits, alternatives discussed with patient including GI upset, nausea vomiting diarrhea, theoretical decrease of seizure threshold predisposing the patient to a slightly higher seizure risk, headaches, sexual dysfunction, serotonin syndrome, bleeding risk.  After discussion of these risks and benefits, the patient voiced understanding and agreed to proceed.  BUSPAR (BUSPIRONE) Risks, benefits, alternatives discussed with patient including nausea, GI upset, mild sedation, falls risk.  After discussion of these risks and benefits, the patient voiced understanding and agreed to proceed.  ABILIFY (ARIPIPRAZOLE) Risks, benefits, alternatives discussed with patient including increased energy, exacerbation of irritability, akathisia, GI upset, orthostatic hypotension, increased appetite, tardive dyskinesia.  After discussion of these risks and benefits, the patient voiced understanding and agreed to proceed.  Medications:   New Medications Ordered  This Visit   Medications    escitalopram (Lexapro) 10 MG tablet     Sig: Take 1 tablet by mouth Daily.     Dispense:  30 tablet     Refill:  1     Increase dose. Thx 4 All U Do!    ARIPiprazole (Abilify) 2 MG tablet     Sig: Take 1 tablet by mouth Daily.     Dispense:  30 tablet     Refill:  1      ANUJA reviewed.   Discussed medication options and treatment plan of prescribed medication as well as the risks, benefits, and side effects.  Patient is agreeable to call the office with any worsening of symptoms or onset of side effects.   Patient is agreeable to call 911 or go to the nearest ER should he/she begin having SI/HI.   Patient acknowledged, is agreeable to continue with current treatment plan, and was educated on the importance of compliance with treatment and follow-up appointments.  Addressed all questions and concerns.     Psychotherapy:    Provided minimal supportive therapy.  Functional status: Moderate symptoms OR moderate difficulty in social occupational or social functioning (51-60)  Prognosis: Fair with expectation for some response to treatment  Progress: symptoms have progressed to a point and plateaued      Follow-up: Return in about 2 months (around 9/29/2025).     This document has been electronically signed by CHEVY Ordonez  July 30, 2025 10:41 EDT  Please note that portions of this note were completed with a voice recognition program.  Copied text in this note has been reviewed and is accurate as of 07/30/25

## 2025-07-30 NOTE — PATIENT INSTRUCTIONS
1.  Please return to clinic at your next scheduled visit.  Please contact the clinic ([phone]) at least 24 hours prior in the event you need to cancel.  2.  Do no harm to yourself or others.    3.  Avoid alcohol and drugs.    4.  Take all medications as prescribed.  Please contact the clinic with any concerns. If you are in need of medication refills, please call the clinic at [phone].    5. Should you want to get in touch with your provider, CHEVY Ordonez, please contact the office ([phone]), and staff will be able to page Janina directly.  6. In the event you have personal crisis, contact the following crisis numbers: Suicide Prevention Hotline 1-300.379.4600; [facility] Helpline 0-667-256-ALAR; [facility] Emergency Room [phone]; text HELLO to 793473; or 797.     SPECIFIC RECOMMENDATIONS:     1.      Medications discussed at this encounter:     New Medications Ordered This Visit   Medications    escitalopram (Lexapro) 10 MG tablet     Sig: Take 1 tablet by mouth Daily.     Dispense:  30 tablet     Refill:  1     Increase dose. Thx 4 All U Do!    ARIPiprazole (Abilify) 2 MG tablet     Sig: Take 1 tablet by mouth Daily.     Dispense:  30 tablet     Refill:  1                       2.      Psychotherapy recommendations: We will continue therapy at future visits.     3.     Return to clinic: Return in about 2 months (around 9/29/2025).